# Patient Record
Sex: MALE | Race: WHITE | Employment: OTHER | URBAN - METROPOLITAN AREA
[De-identification: names, ages, dates, MRNs, and addresses within clinical notes are randomized per-mention and may not be internally consistent; named-entity substitution may affect disease eponyms.]

---

## 2017-02-07 ENCOUNTER — HOSPITAL ENCOUNTER (INPATIENT)
Facility: HOSPITAL | Age: 82
LOS: 9 days | Discharge: NURSING, REHAB OR LONG-TERM CARE | DRG: 190 | End: 2017-02-16
Attending: EMERGENCY MEDICINE | Admitting: HOSPITALIST
Payer: MEDICARE

## 2017-02-07 ENCOUNTER — APPOINTMENT (OUTPATIENT)
Dept: CT IMAGING | Facility: HOSPITAL | Age: 82
DRG: 190 | End: 2017-02-07
Attending: EMERGENCY MEDICINE
Payer: MEDICARE

## 2017-02-07 ENCOUNTER — APPOINTMENT (OUTPATIENT)
Dept: GENERAL RADIOLOGY | Facility: HOSPITAL | Age: 82
DRG: 190 | End: 2017-02-07
Attending: EMERGENCY MEDICINE
Payer: MEDICARE

## 2017-02-07 DIAGNOSIS — R04.2 HEMOPTYSIS: Primary | ICD-10-CM

## 2017-02-07 LAB
ALBUMIN SERPL BCP-MCNC: 2.9 G/DL (ref 3.5–4.8)
ALBUMIN/GLOB SERPL: 0.8 {RATIO} (ref 1–2)
ALP SERPL-CCNC: 49 U/L (ref 32–100)
ALT SERPL-CCNC: 13 U/L (ref 17–63)
ANION GAP SERPL CALC-SCNC: 6 MMOL/L (ref 0–18)
ANTIBODY SCREEN: NEGATIVE
APTT PPP: 46.9 SECONDS (ref 23.2–35.3)
AST SERPL-CCNC: 20 U/L (ref 15–41)
BASOPHILS # BLD: 0.1 K/UL (ref 0–0.2)
BASOPHILS NFR BLD: 1 %
BILIRUB SERPL-MCNC: 0.9 MG/DL (ref 0.3–1.2)
BUN SERPL-MCNC: 12 MG/DL (ref 8–20)
BUN/CREAT SERPL: 10.1 (ref 10–20)
CALCIUM SERPL-MCNC: 8.5 MG/DL (ref 8.5–10.5)
CHLORIDE SERPL-SCNC: 107 MMOL/L (ref 95–110)
CO2 SERPL-SCNC: 31 MMOL/L (ref 22–32)
CREAT SERPL-MCNC: 1.19 MG/DL (ref 0.5–1.5)
EOSINOPHIL # BLD: 0.1 K/UL (ref 0–0.7)
EOSINOPHIL NFR BLD: 2 %
ERYTHROCYTE [DISTWIDTH] IN BLOOD BY AUTOMATED COUNT: 15.3 % (ref 11–15)
GLOBULIN PLAS-MCNC: 3.5 G/DL (ref 2.5–3.7)
GLUCOSE SERPL-MCNC: 92 MG/DL (ref 70–99)
HCT VFR BLD AUTO: 42.5 % (ref 41–52)
HGB BLD-MCNC: 13.8 G/DL (ref 13.5–17.5)
INR BLD: 4.4 (ref 0.9–1.2)
LYMPHOCYTES # BLD: 1 K/UL (ref 1–4)
LYMPHOCYTES NFR BLD: 16 %
MCH RBC QN AUTO: 29.8 PG (ref 27–32)
MCHC RBC AUTO-ENTMCNC: 32.3 G/DL (ref 32–37)
MCV RBC AUTO: 92.3 FL (ref 80–100)
MONOCYTES # BLD: 0.7 K/UL (ref 0–1)
MONOCYTES NFR BLD: 11 %
NEUTROPHILS # BLD AUTO: 4.3 K/UL (ref 1.8–7.7)
NEUTROPHILS NFR BLD: 69 %
OSMOLALITY UR CALC.SUM OF ELEC: 297 MOSM/KG (ref 275–295)
PLATELET # BLD AUTO: 163 K/UL (ref 140–400)
PMV BLD AUTO: 7.6 FL (ref 7.4–10.3)
POTASSIUM SERPL-SCNC: 4.1 MMOL/L (ref 3.3–5.1)
PROT SERPL-MCNC: 6.4 G/DL (ref 5.9–8.4)
PROTHROMBIN TIME: 41.5 SECONDS (ref 11.8–14.5)
RBC # BLD AUTO: 4.61 M/UL (ref 4.5–5.9)
RH BLOOD TYPE: POSITIVE
SODIUM SERPL-SCNC: 144 MMOL/L (ref 136–144)
WBC # BLD AUTO: 6.3 K/UL (ref 4–11)

## 2017-02-07 PROCEDURE — 74177 CT ABD & PELVIS W/CONTRAST: CPT

## 2017-02-07 PROCEDURE — 99223 1ST HOSP IP/OBS HIGH 75: CPT | Performed by: HOSPITALIST

## 2017-02-07 PROCEDURE — 71010 XR CHEST AP PORTABLE  (CPT=71010): CPT

## 2017-02-07 PROCEDURE — 71260 CT THORAX DX C+: CPT

## 2017-02-07 RX ORDER — FLUTICASONE PROPIONATE AND SALMETEROL 500; 50 UG/1; UG/1
1 POWDER RESPIRATORY (INHALATION) 2 TIMES DAILY
COMMUNITY
End: 2018-01-02

## 2017-02-07 RX ORDER — CALCIUM CARBONATE 500(1250)
500 TABLET ORAL DAILY
Status: DISCONTINUED | OUTPATIENT
Start: 2017-02-08 | End: 2017-02-16

## 2017-02-07 RX ORDER — IPRATROPIUM BROMIDE AND ALBUTEROL SULFATE 2.5; .5 MG/3ML; MG/3ML
3 SOLUTION RESPIRATORY (INHALATION) EVERY 6 HOURS PRN
Status: DISCONTINUED | OUTPATIENT
Start: 2017-02-07 | End: 2017-02-16

## 2017-02-07 RX ORDER — CARVEDILOL 6.25 MG/1
6.25 TABLET ORAL 2 TIMES DAILY WITH MEALS
Status: DISCONTINUED | OUTPATIENT
Start: 2017-02-07 | End: 2017-02-16

## 2017-02-07 RX ORDER — ACETAMINOPHEN 325 MG/1
650 TABLET ORAL EVERY 6 HOURS PRN
Status: DISCONTINUED | OUTPATIENT
Start: 2017-02-07 | End: 2017-02-16

## 2017-02-07 RX ORDER — ASPIRIN 81 MG/1
81 TABLET, CHEWABLE ORAL DAILY
COMMUNITY

## 2017-02-07 RX ORDER — ASPIRIN 81 MG/1
81 TABLET, CHEWABLE ORAL DAILY
Status: DISCONTINUED | OUTPATIENT
Start: 2017-02-08 | End: 2017-02-16

## 2017-02-07 RX ORDER — SODIUM PHOSPHATE, DIBASIC AND SODIUM PHOSPHATE, MONOBASIC 7; 19 G/133ML; G/133ML
1 ENEMA RECTAL ONCE AS NEEDED
Status: ACTIVE | OUTPATIENT
Start: 2017-02-07 | End: 2017-02-07

## 2017-02-07 RX ORDER — POLYETHYLENE GLYCOL 3350 17 G/17G
17 POWDER, FOR SOLUTION ORAL DAILY PRN
Status: DISCONTINUED | OUTPATIENT
Start: 2017-02-07 | End: 2017-02-16

## 2017-02-07 RX ORDER — GUAIFENESIN 100 MG/5ML
200 SOLUTION ORAL EVERY 4 HOURS PRN
Status: DISCONTINUED | OUTPATIENT
Start: 2017-02-07 | End: 2017-02-16

## 2017-02-07 RX ORDER — SODIUM CHLORIDE 9 MG/ML
INJECTION, SOLUTION INTRAVENOUS ONCE
Status: COMPLETED | OUTPATIENT
Start: 2017-02-07 | End: 2017-02-07

## 2017-02-07 RX ORDER — DOCUSATE SODIUM 100 MG/1
100 CAPSULE, LIQUID FILLED ORAL 2 TIMES DAILY
Status: DISCONTINUED | OUTPATIENT
Start: 2017-02-07 | End: 2017-02-16

## 2017-02-07 RX ORDER — BISACODYL 10 MG
10 SUPPOSITORY, RECTAL RECTAL
Status: DISCONTINUED | OUTPATIENT
Start: 2017-02-07 | End: 2017-02-16

## 2017-02-07 RX ORDER — DOXEPIN HYDROCHLORIDE 50 MG/1
1 CAPSULE ORAL DAILY
COMMUNITY

## 2017-02-07 RX ORDER — EZETIMIBE AND SIMVASTATIN 10; 20 MG/1; MG/1
1 TABLET ORAL NIGHTLY
COMMUNITY

## 2017-02-07 RX ORDER — RAMIPRIL 2.5 MG/1
5 CAPSULE ORAL DAILY
Status: DISCONTINUED | OUTPATIENT
Start: 2017-02-08 | End: 2017-02-09

## 2017-02-07 RX ORDER — WARFARIN SODIUM 1 MG/1
5 TABLET ORAL NIGHTLY
Status: ON HOLD | COMMUNITY
End: 2017-02-16

## 2017-02-07 RX ORDER — SODIUM CHLORIDE 9 MG/ML
INJECTION, SOLUTION INTRAVENOUS
Status: COMPLETED
Start: 2017-02-07 | End: 2017-02-07

## 2017-02-07 RX ORDER — FUROSEMIDE 20 MG/1
20 TABLET ORAL
Status: DISCONTINUED | OUTPATIENT
Start: 2017-02-10 | End: 2017-02-09

## 2017-02-07 RX ORDER — FUROSEMIDE 40 MG/1
40 TABLET ORAL
Status: DISCONTINUED | OUTPATIENT
Start: 2017-02-08 | End: 2017-02-09

## 2017-02-07 RX ORDER — ACETAMINOPHEN 500 MG
500 TABLET ORAL 2 TIMES DAILY
Status: DISCONTINUED | OUTPATIENT
Start: 2017-02-07 | End: 2017-02-16

## 2017-02-07 RX ORDER — FUROSEMIDE 20 MG/1
40 TABLET ORAL DAILY
COMMUNITY

## 2017-02-07 RX ORDER — ACETAMINOPHEN 500 MG
500 TABLET ORAL 2 TIMES DAILY
Status: ON HOLD | COMMUNITY
End: 2017-02-16

## 2017-02-07 RX ORDER — ONDANSETRON 2 MG/ML
4 INJECTION INTRAMUSCULAR; INTRAVENOUS EVERY 6 HOURS PRN
Status: DISCONTINUED | OUTPATIENT
Start: 2017-02-07 | End: 2017-02-16

## 2017-02-07 RX ORDER — DOXEPIN HYDROCHLORIDE 50 MG/1
1 CAPSULE ORAL DAILY
Status: DISCONTINUED | OUTPATIENT
Start: 2017-02-08 | End: 2017-02-16

## 2017-02-07 RX ORDER — CARVEDILOL 6.25 MG/1
6.25 TABLET ORAL 2 TIMES DAILY WITH MEALS
COMMUNITY
End: 2018-01-02

## 2017-02-07 RX ORDER — AZITHROMYCIN 250 MG/1
500 TABLET, FILM COATED ORAL ONCE
Status: COMPLETED | OUTPATIENT
Start: 2017-02-07 | End: 2017-02-07

## 2017-02-07 RX ORDER — RAMIPRIL 5 MG/1
5 CAPSULE ORAL DAILY
COMMUNITY
End: 2018-01-01

## 2017-02-07 NOTE — ED PROVIDER NOTES
Patient Seen in: [facility] AND [facility] Emergency Department    History   Patient presents with:  Hemoptysis (respiratory)    Stated Complaint: Hemoptysis    HPI    49-year-old male presents for evaluation of hemoptysis.   Patient reports last night he had phl cannula       Current:/83 mmHg  Pulse 75  Resp 24  Ht 180.3 cm (5' 11\")  Wt 81.647 kg  BMI 25.12 kg/m2  SpO2 94%        Physical Exam   Constitutional: He is oriented to person, place, and time. He appears well-developed and well-nourished.  No distr Hyperinflation with COPD/centrilobular emphysema and multifocal pleural-parenchymal scarring. 4. Mild central pulmonary interstitial edema.                 ED Course     Labs Reviewed   COMP METABOLIC PANEL (14) - Abnormal; Notable for the following:     A GREEN   RAINBOW DRAW LAVENDER TALL (BNP)      EKG    Rate, intervals and axes as noted on EKG Report.   QTc 455   Rate: 62  Rhythm: Atrial Fibrillation  Reading: Rate controlled atrial fibrillation with right bundle branch block            MDM   Diff

## 2017-02-07 NOTE — H&P
[facility]    PATIENT'S NAME: Jose Wood   ATTENDING PHYSICIAN: Rosa Main MD   PATIENT ACCOUNT#:   [de-identified]    LOCATION:  [place] [address]  MEDICAL RECORD #:   [id number]       YOB: 1929  ADMISSION DATE:       02/07/2 OF SYSTEMS:  Progressive cough with occasional wheezing for the last week or two. No fever or chills. Lately, he has been noticing he had specks of blood with his sputum, which is clear to green. The patient denies any sick contacts. No chest pain.   Ot 16:14:21  t: 02/07/2017 16:24:39  Job [id number]  /

## 2017-02-07 NOTE — ED INITIAL ASSESSMENT (HPI)
Patient complains of hemoptysis starting this morning.  States that he's had increased work in breathing over the past month but denies any newer onset AMBROSE, SOB, or CP

## 2017-02-08 ENCOUNTER — APPOINTMENT (OUTPATIENT)
Dept: GENERAL RADIOLOGY | Facility: HOSPITAL | Age: 82
DRG: 190 | End: 2017-02-08
Attending: HOSPITALIST
Payer: MEDICARE

## 2017-02-08 ENCOUNTER — APPOINTMENT (OUTPATIENT)
Dept: ULTRASOUND IMAGING | Facility: HOSPITAL | Age: 82
DRG: 190 | End: 2017-02-08
Attending: INTERNAL MEDICINE
Payer: MEDICARE

## 2017-02-08 ENCOUNTER — APPOINTMENT (OUTPATIENT)
Dept: GENERAL RADIOLOGY | Facility: HOSPITAL | Age: 82
DRG: 190 | End: 2017-02-08
Attending: INTERNAL MEDICINE
Payer: MEDICARE

## 2017-02-08 LAB
AMYLASE PLEURAL FLUID: 37 U/L
ANION GAP SERPL CALC-SCNC: 6 MMOL/L (ref 0–18)
BASOPHILS # BLD: 0.1 K/UL (ref 0–0.2)
BASOPHILS NFR BLD: 1 %
BUN SERPL-MCNC: 12 MG/DL (ref 8–20)
BUN/CREAT SERPL: 10.3 (ref 10–20)
CALCIUM SERPL-MCNC: 8 MG/DL (ref 8.5–10.5)
CHLORIDE SERPL-SCNC: 106 MMOL/L (ref 95–110)
CO2 SERPL-SCNC: 28 MMOL/L (ref 22–32)
CREAT SERPL-MCNC: 1.16 MG/DL (ref 0.5–1.5)
EOSINOPHIL # BLD: 0.2 K/UL (ref 0–0.7)
EOSINOPHIL NFR BLD: 3 %
ERYTHROCYTE [DISTWIDTH] IN BLOOD BY AUTOMATED COUNT: 15.3 % (ref 11–15)
GLUCOSE PLEURAL FLUID: 121 MG/DL
GLUCOSE SERPL-MCNC: 85 MG/DL (ref 70–99)
HCT VFR BLD AUTO: 40 % (ref 41–52)
HGB BLD-MCNC: 12.9 G/DL (ref 13.5–17.5)
INR BLD: 1.4 (ref 0.9–1.2)
L PNEUMO AG UR QL: NEGATIVE
LDH PLEURAL FLUID: 72 U/L
LYMPHOCYTES # BLD: 1.2 K/UL (ref 1–4)
LYMPHOCYTES NFR BLD: 21 %
Lab: <10 MG/DL
MCH RBC QN AUTO: 29.6 PG (ref 27–32)
MCHC RBC AUTO-ENTMCNC: 32.2 G/DL (ref 32–37)
MCV RBC AUTO: 91.9 FL (ref 80–100)
MONOCYTES # BLD: 0.7 K/UL (ref 0–1)
MONOCYTES NFR BLD: 12 %
NEUTROPHILS # BLD AUTO: 3.6 K/UL (ref 1.8–7.7)
NEUTROPHILS NFR BLD: 63 %
OSMOLALITY UR CALC.SUM OF ELEC: 289 MOSM/KG (ref 275–295)
PLATELET # BLD AUTO: 145 K/UL (ref 140–400)
PMV BLD AUTO: 7.5 FL (ref 7.4–10.3)
POTASSIUM SERPL-SCNC: 4.1 MMOL/L (ref 3.3–5.1)
PROTHROMBIN TIME: 16.3 SECONDS (ref 11.8–14.5)
RBC # BLD AUTO: 4.36 M/UL (ref 4.5–5.9)
RBC # FLD: ABNORMAL /CUMM (ref ?–1)
SODIUM SERPL-SCNC: 140 MMOL/L (ref 136–144)
STREP PNEUMO ANTIGEN, URINE: NEGATIVE
TOTAL PROTEIN PLEURAL FLUID: 2.1 G/DL
WBC # BLD AUTO: 5.8 K/UL (ref 4–11)
WBC # FLD: 1092 /CUMM (ref ?–1)

## 2017-02-08 PROCEDURE — 0W9B3ZX DRAINAGE OF LEFT PLEURAL CAVITY, PERCUTANEOUS APPROACH, DIAGNOSTIC: ICD-10-PCS | Performed by: INTERNAL MEDICINE

## 2017-02-08 PROCEDURE — 71010 XR CHEST AP PORTABLE  (CPT=71010): CPT

## 2017-02-08 PROCEDURE — 32555 ASPIRATE PLEURA W/ IMAGING: CPT | Performed by: INTERNAL MEDICINE

## 2017-02-08 PROCEDURE — 71020 XR CHEST PA + LAT CHEST (CPT=71020): CPT

## 2017-02-08 PROCEDURE — 32555 ASPIRATE PLEURA W/ IMAGING: CPT

## 2017-02-08 PROCEDURE — 99223 1ST HOSP IP/OBS HIGH 75: CPT | Performed by: INTERNAL MEDICINE

## 2017-02-08 PROCEDURE — 99233 SBSQ HOSP IP/OBS HIGH 50: CPT | Performed by: HOSPITALIST

## 2017-02-08 NOTE — PROGRESS NOTES
[facility]    Progress Note    Woo Glynn Patient Status:  Inpatient    1929 MRN [id number]   Location [facility] 3W/SW Attending Arzella Saint, MD   Hosp Day # 1 PCP Haylee Wolff MD       SUBJECTIVE:  Today still wi Only)(jgl=[id number])    2/7/2017  CONCLUSION:  1. Multifocal pneumonia, worse in the left upper and lower lobes, with lesser airspace disease involving the right lower lobe.  Followup is recommended to document resolution and exclude potential underlying Mon Wed Thu Sat   [START ON 2/10/2017] furosemide (LASIX) tab 20 mg 20 mg Oral Once per day on Tue Fri   ramipril (ALTACE) cap 5 mg 5 mg Oral Daily   carvedilol (COREG) tab 6.25 mg 6.25 mg Oral BID with meals   ezetimibe (ZETIA) 10 mg, Atorvastatin Calcium will require TBD.

## 2017-02-08 NOTE — CONSULTS
[facility]    Report of Consultation    Maryclare Handing Patient Status:  Inpatient    1929 MRN [id number]   Location [facility] 3W/SW Attending Benito Elam MD   Hosp Day # 1 PCP Alexia Oreilly MD     Date of Admission:   guaiFENesin (ROBITUSSIN) 100 MG/5ML solution 200 mg 200 mg Oral Q4H PRN   acetaminophen (TYLENOL) tab 650 mg 650 mg Oral Q6H PRN   ondansetron HCl (ZOFRAN) injection 4 mg 4 mg Intravenous Q6H PRN   docusate sodium (COLACE) cap 100 mg 100 mg Oral BID   PE multivitamin Oral Tab Take 1 tablet by mouth daily. Calcium Carbonate Antacid 600 MG Oral Chew Tab Chew 600 mg by mouth daily. acetaminophen 500 MG Oral Tab Take 500 mg by mouth 2 (two) times daily.        Allergies    Morphine                Hypotens [facility], XR CHEST AP PORTABLE (CPT=71010), 2/07/2017, 12:35. INDICATIONS: Cough. Hx COPD, CHF, pneumonia. History hemoptysis  TECHNIQUE:   Two views. FINDINGS: CARDIAC/VASC: Mild Cardiomegaly. .  Unremarkable pulmonary vasculature.   MEDIAST/HIL technique for dose reduction was employed. FINDINGS: CARDIAC: The heart is borderline enlarged. Aortic annular calcifications are observed. Atherosclerotic vascular calcifications are present in the coronary vessels.  VASCULATURE: The thoracic aorta has u identified and are not completely characterized, but statistically likely represent cysts. GI/MESENTERY:  There is no evidence of bowel obstruction. A normal caliber appendix is seen without inflammatory manifestations.  Scattered colonic diverticula are pr aneurysm sac measuring up to 4.4 cm. 4. Moderate to severe centrilobular emphysema. 5. Mild mediastinal lymphadenopathy may be reactive. 6. Extrahepatic biliary dilatation may reflect age-related ectasia. 7. Lesser incidental findings as above. (CST): Kristyn Henry M.D. on 2/08/2017 at 17:14     Approved by (CST): Kristyn Henry M.D. on 2/08/2017 at 17:19          2/8/2017  CONCLUSION:  1. Mild cardiomegaly. Tortuous thoracic aorta.  2. Bilateral parenchymal abnormality/pneumonitis wi (CST): Shani Durham MD on 2/07/2017 at 12:58          2/7/2017  CONCLUSION:  1. Bibasilar airspace disease may reflect atelectasis with or without superimposed pneumonia. 2. Layering small left pleural effusion.   3. Hyperinflation with COPD/centrilobul

## 2017-02-08 NOTE — PROCEDURES
Left ultrasound-guided thoracentesis    Ultrasound used to visualized moderate sized left pleural effusion with no evidence of  loculations seen and area of entry marked. Patient prepped and draped in sterile fashion.   10 cc of 1% lidocaine used to [employer]

## 2017-02-08 NOTE — PROGRESS NOTES
This patient presented to the emergency room with hemoptysis and exacerbation of COPD. ER physician shows to get a CT of the chest and abdomen which demonstrates an endovascular stent graft in place. I was asked to review the imaging of the stent graft.

## 2017-02-09 LAB
ANION GAP SERPL CALC-SCNC: 4 MMOL/L (ref 0–18)
BASOPHILS # BLD: 0.1 K/UL (ref 0–0.2)
BASOPHILS NFR BLD: 1 %
BASOPHILS NFR FLD: 1 %
BUN SERPL-MCNC: 15 MG/DL (ref 8–20)
BUN/CREAT SERPL: 10.4 (ref 10–20)
CALCIUM SERPL-MCNC: 8.2 MG/DL (ref 8.5–10.5)
CHLORIDE SERPL-SCNC: 103 MMOL/L (ref 95–110)
CO2 SERPL-SCNC: 34 MMOL/L (ref 22–32)
CREAT SERPL-MCNC: 1.44 MG/DL (ref 0.5–1.5)
EOSINOPHIL # BLD: 0.2 K/UL (ref 0–0.7)
EOSINOPHIL NFR BLD: 3 %
EOSINOPHIL NFR FLD: 1 %
ERYTHROCYTE [DISTWIDTH] IN BLOOD BY AUTOMATED COUNT: 15.6 % (ref 11–15)
GLUCOSE SERPL-MCNC: 103 MG/DL (ref 70–99)
HCT VFR BLD AUTO: 39.5 % (ref 41–52)
HGB BLD-MCNC: 12.8 G/DL (ref 13.5–17.5)
LDH SERPL L TO P-CCNC: 160 U/L (ref 98–192)
LYMPHOCYTES # BLD: 1.3 K/UL (ref 1–4)
LYMPHOCYTES NFR BLD: 17 %
LYMPHOCYTES NFR FLD: 71 %
MCH RBC QN AUTO: 29.6 PG (ref 27–32)
MCHC RBC AUTO-ENTMCNC: 32.4 G/DL (ref 32–37)
MCV RBC AUTO: 91.4 FL (ref 80–100)
MONOCYTES # BLD: 0.9 K/UL (ref 0–1)
MONOCYTES NFR BLD: 11 %
MONOCYTES NFR FLD: 19 %
NEUTROPHILS # BLD AUTO: 5.2 K/UL (ref 1.8–7.7)
NEUTROPHILS NFR BLD: 68 %
NEUTROPHILS NFR FLD: 8 %
OSMOLALITY UR CALC.SUM OF ELEC: 293 MOSM/KG (ref 275–295)
PLATELET # BLD AUTO: 147 K/UL (ref 140–400)
PMV BLD AUTO: 7.7 FL (ref 7.4–10.3)
POTASSIUM SERPL-SCNC: 4.3 MMOL/L (ref 3.3–5.1)
PROCALCITONIN SERPL-MCNC: <0.05 NG/ML (ref ?–0.11)
RBC # BLD AUTO: 4.32 M/UL (ref 4.5–5.9)
SODIUM SERPL-SCNC: 141 MMOL/L (ref 136–144)
WBC # BLD AUTO: 7.6 K/UL (ref 4–11)
WBC OTHER NFR FLD: 0 %

## 2017-02-09 PROCEDURE — 99233 SBSQ HOSP IP/OBS HIGH 50: CPT | Performed by: HOSPITALIST

## 2017-02-09 PROCEDURE — 99232 SBSQ HOSP IP/OBS MODERATE 35: CPT | Performed by: INTERNAL MEDICINE

## 2017-02-09 RX ORDER — SODIUM CHLORIDE 9 MG/ML
INJECTION, SOLUTION INTRAVENOUS
Status: COMPLETED
Start: 2017-02-09 | End: 2017-02-09

## 2017-02-09 NOTE — PROGRESS NOTES
[facility]    Progress Note    Tarsha Ortiz Patient Status:  Inpatient    1929 MRN [id number]   Location [facility] 3W/SW Attending Sharon Diana MD   Hosp Day # 2 PCP Katarina Bonds MD       SUBJECTIVE:    Feeling okay Only)(xen=[id number])    2/7/2017  CONCLUSION:  1. Multifocal pneumonia, worse in the left upper and lower lobes, with lesser airspace disease involving the right lower lobe.  Followup is recommended to document resolution and exclude potential underlying Oral Q6H PRN   ondansetron HCl (ZOFRAN) injection 4 mg 4 mg Intravenous Q6H PRN   docusate sodium (COLACE) cap 100 mg 100 mg Oral BID   PEG 3350 (MIRALAX) powder packet 17 g 17 g Oral Daily PRN   magnesium hydroxide (MILK OF MAGNESIA) 400 MG/5ML suspension

## 2017-02-09 NOTE — PLAN OF CARE
Problem: Patient/Family Goals  Goal: Patient/Family Long Term Goal  Patient’s Long Term Goal:     Interventions:  - stay healthy  - See additional Care Plan goals for specific interventions   Outcome: Progressing  Goal: Patient/Family Short Term Goal  Payal

## 2017-02-09 NOTE — PROGRESS NOTES
[facility]     Progress Note        Toan Petit Patient Status:  Inpatient    1929 MRN [id number]   Location [facility] 3W/SW Attending Real Do MD   Hosp Day # 2 PCP Georgie Doss MD       Subjective:   Patient se mg Oral BID   Fluticasone Furoate-Vilanterol (BREO ELLIPTA) 200-25 MCG/INH inhaler 1 puff 1 puff Inhalation Daily   Umeclidinium Bromide (INCRUSE ELLIPTA) 62.5 MCG/INH inhaler 1 puff 1 puff Inhalation Daily       Continuous Infusions:      Physical Exam  C 1. Mild cardiomegaly. Tortuous aorta. 2. Emphysematous changes. Extensive bilateral lung consolidation with marked progression of the perihilar and basilar region. Moderate interval worsening of the right base.  3. Bilateral pleural effusions, left greater AIRWAYS: Mild narrowing of the transverse diameter of the trachea is seen with a slight saber sheath configuration. Mild bronchial wall thickening is appreciated. The tracheobronchial tree is without central mass or obstructing lesion.  MEDIASTINUM/SOCRATES: A loss of vertebral body height of the mid thoracic vertebrae resulting in accentuation of the thoracic kyphosis. Multilevel endplate irregularities are present. 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  ABDOMINAL WALL: There is ventral abdominal laxit consolidation  Dictated by (CST): Delvin Delgadillo MD on 2/08/2017 at 15:46     Approved by (CST): Delvin Delgadillo MD on 2/08/2017 at 15:48          2/8/2017  CONCLUSION:  1.  Ultrasound-guided left lung thoracentesis         Xr Chest Ap Portable  (cpt=71010 onset; shortness of breath of the preceding the one month. TECHNIQUE:   Single view. FINDINGS:  POSITION: The patient is moderately rotated to the right with a slightly apical lordotic projection.  CARDIAC/VASC: The cardiomediastinal silhouette is not en chronic respiratory failure  6.  Chronic atrial fibrillation         Plan   -Patient with evidence of dyspnea, cough, hemoptysis with supratherapeutic INR on presentation  -CT chest with evidence of left sided consolidation with significant left pleural ef

## 2017-02-10 ENCOUNTER — APPOINTMENT (OUTPATIENT)
Dept: CT IMAGING | Facility: HOSPITAL | Age: 82
DRG: 190 | End: 2017-02-10
Attending: HOSPITALIST
Payer: MEDICARE

## 2017-02-10 LAB
ANION GAP SERPL CALC-SCNC: 5 MMOL/L (ref 0–18)
BASOPHILS # BLD: 0.1 K/UL (ref 0–0.2)
BASOPHILS NFR BLD: 1 %
BUN SERPL-MCNC: 13 MG/DL (ref 8–20)
BUN/CREAT SERPL: 10.3 (ref 10–20)
CALCIUM SERPL-MCNC: 8.4 MG/DL (ref 8.5–10.5)
CHLORIDE SERPL-SCNC: 104 MMOL/L (ref 95–110)
CO2 SERPL-SCNC: 32 MMOL/L (ref 22–32)
CREAT SERPL-MCNC: 1.26 MG/DL (ref 0.5–1.5)
EOSINOPHIL # BLD: 0.2 K/UL (ref 0–0.7)
EOSINOPHIL NFR BLD: 4 %
ERYTHROCYTE [DISTWIDTH] IN BLOOD BY AUTOMATED COUNT: 15.3 % (ref 11–15)
GLUCOSE SERPL-MCNC: 88 MG/DL (ref 70–99)
HCT VFR BLD AUTO: 41.3 % (ref 41–52)
HGB BLD-MCNC: 13.5 G/DL (ref 13.5–17.5)
INR BLD: 1.5 (ref 0.9–1.2)
LYMPHOCYTES # BLD: 1.1 K/UL (ref 1–4)
LYMPHOCYTES NFR BLD: 17 %
MAGNESIUM SERPL-MCNC: 2 MG/DL (ref 1.8–2.5)
MCH RBC QN AUTO: 29.8 PG (ref 27–32)
MCHC RBC AUTO-ENTMCNC: 32.8 G/DL (ref 32–37)
MCV RBC AUTO: 91 FL (ref 80–100)
MONOCYTES # BLD: 0.8 K/UL (ref 0–1)
MONOCYTES NFR BLD: 12 %
NEUTROPHILS # BLD AUTO: 4.3 K/UL (ref 1.8–7.7)
NEUTROPHILS NFR BLD: 66 %
OSMOLALITY UR CALC.SUM OF ELEC: 292 MOSM/KG (ref 275–295)
PLATELET # BLD AUTO: 146 K/UL (ref 140–400)
PMV BLD AUTO: 7.6 FL (ref 7.4–10.3)
POTASSIUM SERPL-SCNC: 4.2 MMOL/L (ref 3.3–5.1)
PROTHROMBIN TIME: 17.5 SECONDS (ref 11.8–14.5)
RBC # BLD AUTO: 4.53 M/UL (ref 4.5–5.9)
SODIUM SERPL-SCNC: 141 MMOL/L (ref 136–144)
WBC # BLD AUTO: 6.5 K/UL (ref 4–11)

## 2017-02-10 PROCEDURE — 71260 CT THORAX DX C+: CPT

## 2017-02-10 PROCEDURE — 99232 SBSQ HOSP IP/OBS MODERATE 35: CPT | Performed by: INTERNAL MEDICINE

## 2017-02-10 PROCEDURE — 99233 SBSQ HOSP IP/OBS HIGH 50: CPT | Performed by: HOSPITALIST

## 2017-02-10 RX ORDER — SODIUM CHLORIDE 9 MG/ML
INJECTION, SOLUTION INTRAVENOUS
Status: COMPLETED
Start: 2017-02-10 | End: 2017-02-10

## 2017-02-10 RX ORDER — DIPHENHYDRAMINE HCL 25 MG
25 CAPSULE ORAL ONCE
Status: COMPLETED | OUTPATIENT
Start: 2017-02-10 | End: 2017-02-10

## 2017-02-10 RX ORDER — SODIUM CHLORIDE 9 MG/ML
INJECTION, SOLUTION INTRAVENOUS CONTINUOUS
Status: DISCONTINUED | OUTPATIENT
Start: 2017-02-10 | End: 2017-02-16

## 2017-02-10 RX ORDER — 0.9 % SODIUM CHLORIDE 0.9 %
VIAL (ML) INJECTION
Status: DISPENSED
Start: 2017-02-10 | End: 2017-02-11

## 2017-02-10 NOTE — PLAN OF CARE
Patient/Family Goals    • Patient/Family Long Term Goal Progressing    • Patient/Family Short Term Goal Progressing        RESPIRATORY - ADULT    • Achieves optimal ventilation and oxygenation Progressing        Pt has HX COPD & Emphysema and wears 2.3-3L

## 2017-02-10 NOTE — PROGRESS NOTES
[facility]    Progress Note    Alaurelia Zimmerman Patient Status:  Inpatient    1929 MRN [id number]   Location [facility] 3W/SW Attending Damián Elizabeth MD   Hosp Day # 3 PCP Dale Luke MD       SUBJECTIVE:    Feeling abou Ct Chest+abdomen+pelvis(all Cntrst Only)(cpt=71260/01636)    2/7/2017  CONCLUSION:  1. Multifocal pneumonia, worse in the left upper and lower lobes, with lesser airspace disease involving the right lower lobe.  Followup is recommended to document resol acetaminophen (TYLENOL) tab 650 mg 650 mg Oral Q6H PRN   ondansetron HCl (ZOFRAN) injection 4 mg 4 mg Intravenous Q6H PRN   docusate sodium (COLACE) cap 100 mg 100 mg Oral BID   PEG 3350 (MIRALAX) powder packet 17 g 17 g Oral Daily PRN   magnesium hydrox counseling re: treatment plan and workup      Georgina Magallanes MD

## 2017-02-10 NOTE — CM/SW NOTE
Met with patient at bedside to enroll patient in [address] [place] [address] program under DRG (194). Brochure was provided, program was explained. Patient is agreeable to program's 3 month phone call follow up.  [facility] brochure was provided, progra

## 2017-02-10 NOTE — PROGRESS NOTES
[facility]     Progress Note        Read Ralph Patient Status:  Inpatient    1929 MRN [id number]   Location [facility] 3W/SW Attending Fredy Hester MD   Hosp Day # 3 PCP Shannan Saldana MD       Subjective:   Patient se (OSCAL) tab 500 mg 500 mg Oral Daily   acetaminophen (TYLENOL EXTRA STRENGTH) tab 500 mg 500 mg Oral BID   Fluticasone Furoate-Vilanterol (BREO ELLIPTA) 200-25 MCG/INH inhaler 1 puff 1 puff Inhalation Daily   Umeclidinium Bromide (INCRUSE ELLIPTA) 62.5 MCG thoracic kyphosis. Dictated by (CST): Alexander Sigala M.D. on 2/08/2017 at 10:29     Approved by (CST): Alexander Sigala M.D. on 2/08/2017 at 10:33          2/8/2017  CONCLUSION:  1. Mild cardiomegaly. Tortuous aorta. 2. Emphysematous changes.  Ex present in the right lower lobe. Moderate left pleural effusion with partial loculation is present. There is a small to moderate right pleural effusion. No pneumothorax is detected.   AIRWAYS: Mild narrowing of the transverse diameter of the trachea is seen RETROPERITONEUM: No mass or lymphadenopathy is apparent. BONES:   Multiapex scoliosis of the thoracolumbar spine. Multilevel degenerative changes of the spine are demonstrated.  There is loss of vertebral body height of the mid thoracic vertebrae resulting Ultrasound guided left sided thoracentesis, performed by Dr. Yvonne Todd. No radiologist was present.   Images obtained show large left effusion with secondary left lung atelectasis and/or partial consolidation  Dictated by (CST): Jeannine Jack MD on 2/08/201 10/13/2014, 16:22. [facility], X CHEST PA LAT ROUTINE, 1/17/2016, 20:45. [facility], X CHEST PA LAT ROUTINE, 2/01/2016, 10:56. INDICATIONS: Hemoptysis of acute onset; shortness of breath of the preceding the one month.   Yuri Barahona significant left pleural effusion and atelectatic changes seen.  Concern for pneumonia.  -Continue empiric antibiotic therapy.    -Bedside thoracentesis performed with removal of 1500 cc of slightly  blood-tinged pleural fluid.  Awaiting pleural fluid sean

## 2017-02-11 LAB
ANION GAP SERPL CALC-SCNC: 10 MMOL/L (ref 0–18)
BASOPHILS # BLD: 0.1 K/UL (ref 0–0.2)
BASOPHILS NFR BLD: 1 %
BUN SERPL-MCNC: 14 MG/DL (ref 8–20)
BUN/CREAT SERPL: 10.5 (ref 10–20)
CALCIUM SERPL-MCNC: 8.8 MG/DL (ref 8.5–10.5)
CHLORIDE SERPL-SCNC: 101 MMOL/L (ref 95–110)
CO2 SERPL-SCNC: 30 MMOL/L (ref 22–32)
CREAT SERPL-MCNC: 1.33 MG/DL (ref 0.5–1.5)
EOSINOPHIL # BLD: 0.3 K/UL (ref 0–0.7)
EOSINOPHIL NFR BLD: 4 %
ERYTHROCYTE [DISTWIDTH] IN BLOOD BY AUTOMATED COUNT: 15.5 % (ref 11–15)
GLUCOSE SERPL-MCNC: 114 MG/DL (ref 70–99)
HCT VFR BLD AUTO: 43.4 % (ref 41–52)
HGB BLD-MCNC: 14 G/DL (ref 13.5–17.5)
LYMPHOCYTES # BLD: 1.2 K/UL (ref 1–4)
LYMPHOCYTES NFR BLD: 16 %
MCH RBC QN AUTO: 29.8 PG (ref 27–32)
MCHC RBC AUTO-ENTMCNC: 32.3 G/DL (ref 32–37)
MCV RBC AUTO: 92.3 FL (ref 80–100)
MONOCYTES # BLD: 0.8 K/UL (ref 0–1)
MONOCYTES NFR BLD: 11 %
NEUTROPHILS # BLD AUTO: 5.2 K/UL (ref 1.8–7.7)
NEUTROPHILS NFR BLD: 68 %
OSMOLALITY UR CALC.SUM OF ELEC: 293 MOSM/KG (ref 275–295)
PLATELET # BLD AUTO: 169 K/UL (ref 140–400)
PMV BLD AUTO: 7.9 FL (ref 7.4–10.3)
POTASSIUM SERPL-SCNC: 5.1 MMOL/L (ref 3.3–5.1)
RBC # BLD AUTO: 4.7 M/UL (ref 4.5–5.9)
SODIUM SERPL-SCNC: 141 MMOL/L (ref 136–144)
WBC # BLD AUTO: 7.7 K/UL (ref 4–11)

## 2017-02-11 PROCEDURE — 99233 SBSQ HOSP IP/OBS HIGH 50: CPT | Performed by: HOSPITALIST

## 2017-02-11 PROCEDURE — 99232 SBSQ HOSP IP/OBS MODERATE 35: CPT | Performed by: INTERNAL MEDICINE

## 2017-02-11 NOTE — PROGRESS NOTES
[facility]    Progress Note    Jey Graf Patient Status:  Inpatient    1929 MRN [id number]   Location [facility] 3W/SW Attending Darryn Ríos MD   Hosp Day # 4 PCP Maribel Vicente MD       SUBJECTIVE:    Feeling a li Pulmonary artery hypertension. Us Thoracentesis Guided Left (cpt=32555)    2/8/2017  CONCLUSION:  1. Ultrasound-guided left lung thoracentesis         Xr Chest Ap Portable  (cpt=71010)    2/8/2017  CONCLUSION:  1. Mild cardiomegaly.  Tortuous thoracic Multilobar pneumonia with bilateral pleural effusion; rule out early empyema; rule out malignancy.   - cont azithro and zosyn  - sputum culture ordered and reviewed  - CXR image from today reviewed  - Pulm consulted  - s/p thoracentesis  - fluid analysi

## 2017-02-11 NOTE — PLAN OF CARE
Patient/Family Goals    • Patient/Family Long Term Goal Progressing    • Patient/Family Short Term Goal Progressing        RESPIRATORY - ADULT    • Achieves optimal ventilation and oxygenation Progressing        Pt on 3 L of 02 and sats at 90%; INR 1.5 Cou

## 2017-02-11 NOTE — PLAN OF CARE
Patient/Family Goals    • Patient/Family Long Term Goal Progressing    • Patient/Family Short Term Goal Progressing        RESPIRATORY - ADULT    • Achieves optimal ventilation and oxygenation Progressing          Patient on O2 3L, sats 91-92%.  no c/o sob

## 2017-02-11 NOTE — PROGRESS NOTES
[facility]    Progress Note      Assessment and Plan:   1. Hemoptysis with basilar infiltrates– the patient  had been on anticoagulation with an elevated INR. He may have pneumonia with basilar infiltrates.   Alternatively, I cannot rule o

## 2017-02-12 PROCEDURE — 99233 SBSQ HOSP IP/OBS HIGH 50: CPT | Performed by: INTERNAL MEDICINE

## 2017-02-12 PROCEDURE — 99233 SBSQ HOSP IP/OBS HIGH 50: CPT | Performed by: HOSPITALIST

## 2017-02-12 NOTE — PROGRESS NOTES
[facility]    Progress Note    Jey Graf Patient Status:  Inpatient    1929 MRN [id number]   Location [facility] 3W/SW Attending Darryn Ríos MD   Hosp Day # 5 PCP Maribel Vicente MD       SUBJECTIVE:    Feeling much nebulizer solution 3 mL 3 mL Nebulization Q6H PRN   guaiFENesin (ROBITUSSIN) 100 MG/5ML solution 200 mg 200 mg Oral Q4H PRN   acetaminophen (TYLENOL) tab 650 mg 650 mg Oral Q6H PRN   ondansetron HCl (ZOFRAN) injection 4 mg 4 mg Intravenous Q6H PRN   docusa cont to hold coumadin for possible bronch on Monday    Hemoptysis secondary to elevated INR and sheer forces of cough.    - see above    Prophylaxis:   DVT with SCDs    Dispo: pending    Greater than 35 minutes spent, >50% spent counseling re: treatment nick

## 2017-02-12 NOTE — PLAN OF CARE
Patient/Family Goals    • Patient/Family Long Term Goal Progressing    • Patient/Family Short Term Goal Progressing        RESPIRATORY - ADULT    • Achieves optimal ventilation and oxygenation Progressing          Plan of care reviewed, including medicatio

## 2017-02-12 NOTE — PROGRESS NOTES
[facility]    Progress Note    Saad Frey Patient Status:  Inpatient    1929 MRN [id number]   Location [facility] 3W/SW Attending Sulma Gutierrez MD   Hosp Day # 5 PCP Tyrone Palumbo MD     Subjective:   Subjective: 1. Decreased left, and increased right pleural effusions with associated atelectasis. There is lower lobe predominant superimposed pneumonia, the appearance of which is not significantly changed.  Continued follow up to resolution is advised to exclude the

## 2017-02-13 ENCOUNTER — APPOINTMENT (OUTPATIENT)
Dept: GENERAL RADIOLOGY | Facility: HOSPITAL | Age: 82
DRG: 190 | End: 2017-02-13
Attending: INTERNAL MEDICINE
Payer: MEDICARE

## 2017-02-13 LAB
ANION GAP SERPL CALC-SCNC: 7 MMOL/L (ref 0–18)
BASOPHILS # BLD: 0.1 K/UL (ref 0–0.2)
BASOPHILS NFR BLD: 1 %
BUN SERPL-MCNC: 15 MG/DL (ref 8–20)
BUN/CREAT SERPL: 10.9 (ref 10–20)
CALCIUM SERPL-MCNC: 8.5 MG/DL (ref 8.5–10.5)
CHLORIDE SERPL-SCNC: 104 MMOL/L (ref 95–110)
CO2 SERPL-SCNC: 28 MMOL/L (ref 22–32)
CREAT SERPL-MCNC: 1.37 MG/DL (ref 0.5–1.5)
EOSINOPHIL # BLD: 0.2 K/UL (ref 0–0.7)
EOSINOPHIL NFR BLD: 3 %
ERYTHROCYTE [DISTWIDTH] IN BLOOD BY AUTOMATED COUNT: 15.6 % (ref 11–15)
GLUCOSE SERPL-MCNC: 86 MG/DL (ref 70–99)
HCT VFR BLD AUTO: 40 % (ref 41–52)
HGB BLD-MCNC: 13.2 G/DL (ref 13.5–17.5)
LYMPHOCYTES # BLD: 1.6 K/UL (ref 1–4)
LYMPHOCYTES NFR BLD: 19 %
MCH RBC QN AUTO: 30 PG (ref 27–32)
MCHC RBC AUTO-ENTMCNC: 33 G/DL (ref 32–37)
MCV RBC AUTO: 90.9 FL (ref 80–100)
MONOCYTES # BLD: 0.9 K/UL (ref 0–1)
MONOCYTES NFR BLD: 11 %
NEUTROPHILS # BLD AUTO: 5.8 K/UL (ref 1.8–7.7)
NEUTROPHILS NFR BLD: 67 %
OSMOLALITY UR CALC.SUM OF ELEC: 288 MOSM/KG (ref 275–295)
PLATELET # BLD AUTO: 169 K/UL (ref 140–400)
PMV BLD AUTO: 7.6 FL (ref 7.4–10.3)
POTASSIUM SERPL-SCNC: 4.6 MMOL/L (ref 3.3–5.1)
RBC # BLD AUTO: 4.4 M/UL (ref 4.5–5.9)
SODIUM SERPL-SCNC: 139 MMOL/L (ref 136–144)
WBC # BLD AUTO: 8.6 K/UL (ref 4–11)

## 2017-02-13 PROCEDURE — 99233 SBSQ HOSP IP/OBS HIGH 50: CPT | Performed by: HOSPITALIST

## 2017-02-13 PROCEDURE — 99233 SBSQ HOSP IP/OBS HIGH 50: CPT | Performed by: INTERNAL MEDICINE

## 2017-02-13 PROCEDURE — 71020 XR CHEST PA + LAT CHEST (CPT=71020): CPT

## 2017-02-13 RX ORDER — IPRATROPIUM BROMIDE AND ALBUTEROL SULFATE 2.5; .5 MG/3ML; MG/3ML
3 SOLUTION RESPIRATORY (INHALATION)
Status: DISCONTINUED | OUTPATIENT
Start: 2017-02-13 | End: 2017-02-14

## 2017-02-13 NOTE — PLAN OF CARE
CARDIOVASCULAR - ADULT    • Maintains optimal cardiac output and hemodynamic stability Progressing    • Absence of cardiac arrhythmias or at baseline Progressing    A-FIB ON TELE  COUMADIN ON HOLD  RATES CONTROLLED  NO C/O CHEST PAIN, SOB, OR PALPITATIONS

## 2017-02-13 NOTE — PROGRESS NOTES
[facility]    Progress Note    Judy Hernandez Patient Status:  Inpatient    1929 MRN [id number]   Location [facility] 3W/SW Attending Marichuy Marino MD   Hosp Day # 6 PCP Lashell Vaca MD       SUBJECTIVE:    Feeling tire There is lower lobe predominant superimposed pneumonia, the appearance of which is not significantly changed.  Continued follow up to resolution is advised to exclude the possibility of an underlying mass, particularly given the extent of background emphyse pleural effusion; rule out early empyema; rule out malignancy.   - cont azithro and zosyn  - sputum culture ordered and reviewed  - CXR image from today reviewed  - Pulm consulted  - s/p thoracentesis  - fluid analysis reviewed, await final cx  - repeat CT

## 2017-02-14 ENCOUNTER — SURGERY (OUTPATIENT)
Age: 82
End: 2017-02-14

## 2017-02-14 PROCEDURE — 99232 SBSQ HOSP IP/OBS MODERATE 35: CPT | Performed by: INTERNAL MEDICINE

## 2017-02-14 PROCEDURE — 99233 SBSQ HOSP IP/OBS HIGH 50: CPT | Performed by: HOSPITALIST

## 2017-02-14 RX ORDER — IPRATROPIUM BROMIDE AND ALBUTEROL SULFATE 2.5; .5 MG/3ML; MG/3ML
3 SOLUTION RESPIRATORY (INHALATION)
Status: DISCONTINUED | OUTPATIENT
Start: 2017-02-14 | End: 2017-02-16

## 2017-02-14 NOTE — CM/SW NOTE
Met with patient at bedside to deliver IM. Had met with patient earlier this admission. Daughter is at bedside.  Patient states he has talked to his daughter regarding discharge plan, and patient and daughter state they would like to be evaluated by physica

## 2017-02-14 NOTE — PROGRESS NOTES
Pulmonary/Critical Care Follow Up Note    HPI:   Jhonathan Soares is a 80year old male with Patient presents with:  Hemoptysis (respiratory)      PCP Jenelle Hinojosa MD  Admission Attending Stephan Petersen MD    Hospital Day #6    Feeling better  Less cough Daily  •  Umeclidinium Bromide (INCRUSE ELLIPTA) 62.5 MCG/INH inhaler 1 puff, 1 puff, Inhalation, Daily      Allergies:    Morphine                Hypotension  Prednisone                      PHYSICAL EXAM:   Blood pressure 109/63, pulse 82, temperature 97 / emphysema   Plan nebs   Abx   ICS/LABA   out pt PFTs      Peter Jansen MD

## 2017-02-14 NOTE — PHYSICAL THERAPY NOTE
PHYSICAL THERAPY EVALUATION - INPATIENT     Room Number: 334/334-A  Evaluation Date: 2/14/2017  Type of Evaluation: Initial  Physician Order: PT Eval and Treat    Presenting Problem: Hemoptysis  Reason for Therapy: Mobility Dysfunction and Discharge Pl NEUROLOGICAL FINDINGS  Neurological Findings: None                   ACTIVITY TOLERANCE  O2 Saturation: 90%  O2 Saturation with activity 84%  O2 Device: Nasal cannula  Liters of O2:  4  Shortness of breath  Heart Rate: 84   Patient labored after ambula Demonstrates bed mobility, transfers, and ambulation with stand by assist using rolling walker. Patient with narrow base of support, decreased step length, and forward flexed posture.  Patient visibly labored with breathing after ambulation, requiring full

## 2017-02-14 NOTE — HISTORICAL OFFICE NOTE
Claude Gnosticist  : 1929  ACCOUNT:  [id number]  [phone]  PCP: Dr. Jose C Frank     TODAY'S DATE: 2013  DICTATED BY:  Thedford Boast, M.D.]    CHIEF COMPLAINT: [Followup of CAD, of native vessels, Followup of CHF, left ventricular, Followup of Injection    MEDICATIONS: Selected prescriptions see below    VITAL SIGNS: [B/P - 100/60, Pulse - 72, Respiration - 20, Weight - 179, Height -   71, BMI - 25.0]    CONS: well developed, well nourished. EYES: conjunctivae pink.  ENT: mucosa pink and moist. N lunchtime                             04/04/12 Tylenol Extra Streng  500MG     twice daily                              11/24/09 Calcium 500           500-250-  one daily                                09/18/09 Aspirin Adult Low St  81MG      one daily minute. The mitral valve opens normally. There is no evidence of mitral valve prolapse. There is mitral annular calcification. The aortic valve is thickened with some calcification. The valve appears to open normally.   Three cusps of the aortic valve [phone] [facility] - Diagnostic Test Results    TYPE OF TEST: NUCLEAR STRESS TEST - RAMPED SHWETHA PROTOCOL  Date of Test: 04/15/2011  Test Location: [place]    Patient: Stephanie Alberto: [id number]                        Patient Shaina program was utilized. The post-stress SPECT images demonstrate a large sized defect of severe intensity involving the inferior and inferolateral segments of the left ventricle. This goes from the base to the mid ventricle. Resting images are unchanged.  Sudha Rodney Pressure Response: Normal    TEST CONDUCTED BY: Connor Prieto, Exercise Physiologist  Stress EKG Reviewed By: Jairo Martinez MD  SMR/sd    Jairo Martinez M.D.   [facility]     SR/bd - DD: 04/15/11 - DT: 04/18/11 - Job # [id number] - cc: Vince Cervantes did not develop significant symptoms. The resting electrocardiogram demonstrated ST-T wave changes, poor R wave progression, sinus bradycardia, prior inferior wall myocardial infarction.   The stress ECG portion demonstrated normal and did not show ST-segm

## 2017-02-14 NOTE — PROGRESS NOTES
[facility] Patient Status:  Inpatient    1929 MRN [id number]   Location [facility] 3W/SW Attending Benito Elam MD   Hosp Day # 7 PCP Aleixa Oreilly MD       Assessment and Plan: Encounters:  02/14/17 : 177 lb (80.287 kg)        Exam  Gen: No acute distress, alert and oriented x3,   Neck:supple,no JVD  Pulm: Lungs diminished, mildly dyspneic, wearing O2  CV: Heart with irregular rate and rhythm, nl S1,S2 ,no murmur  Abd: Abdomen so

## 2017-02-14 NOTE — PROGRESS NOTES
[facility]     Progress Note        Sukh Rodriguez Patient Status:  Inpatient    1929 MRN [id number]   Location [facility] 3W/SW Attending Earnest Begum MD   Hosp Day # 7 PCP Mandie Roper MD       Subjective:   Patient se mg 500 mg Oral BID   Fluticasone Furoate-Vilanterol (BREO ELLIPTA) 200-25 MCG/INH inhaler 1 puff 1 puff Inhalation Daily   Umeclidinium Bromide (INCRUSE ELLIPTA) 62.5 MCG/INH inhaler 1 puff 1 puff Inhalation Daily       Continuous Infusions:   • sodium chl effusions, left greater than right with interval progression.  4. Followup to interval resolution         Ct Chest+abdomen+pelvis(all Cntrst Only)(cpt=71260/97497)    2/7/2017  PROCEDURE: CT CHEST ABDOMEN PELVIS (ALL CONTRAST ONLY) (CPT=71260/32839)  COMPAR MEDIASTINUM/SOCRATES: A precarinal node measures 1.3 cm in short axis. CHEST WALL: No axillary mass or lymphadenopathy. Diffuse subcutaneous edema is present. LIVER: No enlargement, atrophy, abnormal density, or significant focal lesion is identified.   BILIAR ventral abdominal laxity. Postoperative changes of the ventral abdominal wall are present. Diffuse subcutaneous edema is appreciated, particularly dependently. OTHER: No free air is seen in the abdomen or pelvis.     Dictated by (CST): Cheo Arce MD on Portable  (cpt=71010)    2/8/2017  PROCEDURE: XR CHEST AP PORTABLE (CPT=71010) TIME: 1708 hrs. .   COMPARISON: [facility], XR CHEST PA + LAT CHEST (CPT=71020), 2/08/2017,  INDICATIONS: Post left thoracentesis.  Bilateral pneumonitis and pleur silhouette is not enlarged. There is mild tortuosity of the thoracic aorta with peripheral atherosclerotic vascular calcification. The pulmonary vascularity is within normal limits. MEDIAST/SOCRATES: No visible mass or adenopathy.  LUNGS/PLEURA: Central pulmona previous pleural effusions or thoracentesis. Pleural fluid transudative and lymphocytic predominant.  -Coumadin on hold at this time given underlying hemoptysis.   Cardiology consultation pending regarding Coumadin reinitiation.  -Scant hemoptysis graduall

## 2017-02-14 NOTE — PROGRESS NOTES
[facility]    Progress Note    Asuncion Conklin Patient Status:  Inpatient    1929 MRN [id number]   Location [facility] 3W/SW Attending Tula Krabbe, MD   Hosp Day # 7 PCP Arely Mccrary MD       SUBJECTIVE:    Feeling bett tab 650 mg 650 mg Oral Q6H PRN   ondansetron HCl (ZOFRAN) injection 4 mg 4 mg Intravenous Q6H PRN   docusate sodium (COLACE) cap 100 mg 100 mg Oral BID   PEG 3350 (MIRALAX) powder packet 17 g 17 g Oral Daily PRN   magnesium hydroxide (MILK OF MAGNESIA) 400 elevated INR and sheer forces of cough.    - see above    Prophylaxis:   DVT with SCDs    Dispo: pending    Greater than 35 minutes spent, >50% spent counseling re: treatment plan and workup      Wes Bennett MD

## 2017-02-14 NOTE — PLAN OF CARE
Spoke with Dr. Jihan Vasquez and informed him that the patient has decided not to have a Bronchoscopy tomorrow. Dr. Kemp Drivers placed patient NPO after midnight in case he has decided to do the procedure.  Dr. Jihan Vasquez gave me the ok to change diet back to previous diet fo

## 2017-02-14 NOTE — DISCHARGE PLANNING
2/14CM-MD orders received in regards to discharge planning.  Eli Beckman was meeting with the Patient and his daughter at bedside and informed this Writer that the Patient is waiting for PT and if recommended for SNF would like to go to [facility]

## 2017-02-15 LAB
ANION GAP SERPL CALC-SCNC: 5 MMOL/L (ref 0–18)
BASOPHILS # BLD: 0.1 K/UL (ref 0–0.2)
BASOPHILS NFR BLD: 1 %
BUN SERPL-MCNC: 14 MG/DL (ref 8–20)
BUN/CREAT SERPL: 10.9 (ref 10–20)
CALCIUM SERPL-MCNC: 8.4 MG/DL (ref 8.5–10.5)
CHLORIDE SERPL-SCNC: 106 MMOL/L (ref 95–110)
CO2 SERPL-SCNC: 29 MMOL/L (ref 22–32)
CREAT SERPL-MCNC: 1.29 MG/DL (ref 0.5–1.5)
EOSINOPHIL # BLD: 0.3 K/UL (ref 0–0.7)
EOSINOPHIL NFR BLD: 4 %
ERYTHROCYTE [DISTWIDTH] IN BLOOD BY AUTOMATED COUNT: 16 % (ref 11–15)
GLUCOSE SERPL-MCNC: 106 MG/DL (ref 70–99)
HCT VFR BLD AUTO: 38.8 % (ref 41–52)
HGB BLD-MCNC: 12.7 G/DL (ref 13.5–17.5)
LYMPHOCYTES # BLD: 1.4 K/UL (ref 1–4)
LYMPHOCYTES NFR BLD: 19 %
MCH RBC QN AUTO: 29.9 PG (ref 27–32)
MCHC RBC AUTO-ENTMCNC: 32.6 G/DL (ref 32–37)
MCV RBC AUTO: 91.7 FL (ref 80–100)
MONOCYTES # BLD: 1 K/UL (ref 0–1)
MONOCYTES NFR BLD: 13 %
NEUTROPHILS # BLD AUTO: 4.8 K/UL (ref 1.8–7.7)
NEUTROPHILS NFR BLD: 63 %
OSMOLALITY UR CALC.SUM OF ELEC: 291 MOSM/KG (ref 275–295)
PLATELET # BLD AUTO: 180 K/UL (ref 140–400)
PMV BLD AUTO: 7.4 FL (ref 7.4–10.3)
POTASSIUM SERPL-SCNC: 4.3 MMOL/L (ref 3.3–5.1)
RBC # BLD AUTO: 4.23 M/UL (ref 4.5–5.9)
SODIUM SERPL-SCNC: 140 MMOL/L (ref 136–144)
WBC # BLD AUTO: 7.7 K/UL (ref 4–11)

## 2017-02-15 PROCEDURE — 99232 SBSQ HOSP IP/OBS MODERATE 35: CPT | Performed by: INTERNAL MEDICINE

## 2017-02-15 PROCEDURE — 99233 SBSQ HOSP IP/OBS HIGH 50: CPT | Performed by: HOSPITALIST

## 2017-02-15 RX ORDER — ZOLPIDEM TARTRATE 5 MG/1
5 TABLET ORAL ONCE
Status: COMPLETED | OUTPATIENT
Start: 2017-02-15 | End: 2017-02-15

## 2017-02-15 NOTE — PROGRESS NOTES
[facility]     Progress Note        Adrian Gresham Patient Status:  Inpatient    1929 MRN [id number]   Location [facility] 3W/SW Attending Gene Sky MD   Hosp Day # 8 PCP Mauricio Yu MD       Subjective:   Patient se mg 500 mg Oral Daily   acetaminophen (TYLENOL EXTRA STRENGTH) tab 500 mg 500 mg Oral BID   Fluticasone Furoate-Vilanterol (BREO ELLIPTA) 200-25 MCG/INH inhaler 1 puff 1 puff Inhalation Daily   Umeclidinium Bromide (INCRUSE ELLIPTA) 62.5 MCG/INH inhaler 1 p M.D. on 2/08/2017 at 10:29     Approved by (CST): Magda Hudson M.D. on 2/08/2017 at 10:33          2/8/2017  CONCLUSION:  1. Mild cardiomegaly. Tortuous aorta. 2. Emphysematous changes.  Extensive bilateral lung consolidation with marked progression effusion with partial loculation is present. There is a small to moderate right pleural effusion. No pneumothorax is detected. AIRWAYS: Mild narrowing of the transverse diameter of the trachea is seen with a slight saber sheath configuration.  Mild bronchi BONES:   Multiapex scoliosis of the thoracolumbar spine. Multilevel degenerative changes of the spine are demonstrated. There is loss of vertebral body height of the mid thoracic vertebrae resulting in accentuation of the thoracic kyphosis.  Multilevel endp Natividad Krishnamurthy. No radiologist was present.   Images obtained show large left effusion with secondary left lung atelectasis and/or partial consolidation  Dictated by (CST): Russ Carson MD on 2/08/2017 at 15:46     Approved by (CST): Russ Carson MD on 2/08/ ROUTINE, 1/17/2016, 20:45. [facility], X CHEST PA LAT ROUTINE, 2/01/2016, 10:56. INDICATIONS: Hemoptysis of acute onset; shortness of breath of the preceding the one month. TECHNIQUE:   Single view.    FINDINGS:  POSITION: The patient is -Continue empiric antibiotic therapy. May discontinue upon discharge.  -Bedside thoracentesis performed with removal of 1500 cc of slightly  blood-tinged pleural fluid.  Awaiting pleural fluid analysis.  No evidence of significant loculated fluid seen.  U

## 2017-02-15 NOTE — PROGRESS NOTES
[facility]    Progress Note    Victor Manuel Camacho Patient Status:  Inpatient    1929 MRN [id number]   Location [facility] 3W/SW Attending Katina Vasquez MD     Hosp Day # 8 PCP Wendy Wilkerson MD       SUBJECTIVE:    Feeling bet infusion  Intravenous Continuous   Piperacillin Sod-Tazobactam So (ZOSYN) 3.375 g in dextrose 5 % 100 mL IVPB 3.375 g Intravenous Q8H   ipratropium-albuterol (DUONEB) nebulizer solution 3 mL 3 mL Nebulization Q6H PRN   guaiFENesin (ROBITUSSIN) 100 MG/5ML s then can stop all abx tomorrow  - still requiring oxygen 4 liters  - desats with movement    Chronic obstructive pulmonary disease.  - cont nebs  - stable for now    Chronic atrial fibrillation.   - monitor on tele  - rate controlled  - held coumadin due to

## 2017-02-15 NOTE — OCCUPATIONAL THERAPY NOTE
OCCUPATIONAL THERAPY EVALUATION - INPATIENT     Room Number: 334/334-A  Evaluation Date: 2/15/2017  Type of Evaluation: Initial  Presenting Problem:  (Hemoptysis)    Physician Order: IP Consult to Occupational Therapy  Reason for Therapy: ADL/IADL Dysfunct walk-in)       Occupation/Status:  (Retired)  Hand Dominance: Right  Drives: Yes  Patient Regularly Uses: Glasses; Home O2    Stairs in Home: Elevator  Use of Assistive Device(s): RW for condo, cane for outdoors, w/c for long distances outdoors    Prior [place] Products Score (AM-PAC Scale): 40.22  CMS Modifier (G-Code): CK    FUNCTIONAL TRANSFER ASSESSMENT  Supine to Sit : Supervision (SBA)  Sit to Stand: Supervision (SBA)    Toilet Transfer: SBA; increased rest breaks needed  Shower Transfer: n/t  Chair Transfer: SBA; i

## 2017-02-15 NOTE — PLAN OF CARE
Maintains optimal cardiac output and hemodynamic stability Progressing      Absence of cardiac arrhythmias or at baseline Progressing    Coumadin continues on hold, afib with heart rate controlled  Achieves optimal ventilation and oxygenation Progressing

## 2017-02-15 NOTE — PROGRESS NOTES
[facility]    Progress Note    Angelyn Schwab Patient Status:  Inpatient    1929 MRN [id number]   Location [facility] 3W/SW Attending Madelaine Tsang MD   Hosp Day # 8 PCP Angeli Aiken MD         Assessment and Plan:      H Inhalation Daily   • Umeclidinium Bromide  1 puff Inhalation Daily             Results:     Lab Results  Component Value Date   WBC 7.7 02/15/2017   HGB 12.7* 02/15/2017   HCT 38.8* 02/15/2017    02/15/2017   CREATSERUM 1.29 02/15/2017   BUN 14 02/1

## 2017-02-16 VITALS
DIASTOLIC BLOOD PRESSURE: 90 MMHG | HEIGHT: 71 IN | OXYGEN SATURATION: 90 % | RESPIRATION RATE: 20 BRPM | SYSTOLIC BLOOD PRESSURE: 143 MMHG | BODY MASS INDEX: 25.11 KG/M2 | WEIGHT: 179.38 LBS | TEMPERATURE: 98 F | HEART RATE: 73 BPM

## 2017-02-16 PROCEDURE — 99232 SBSQ HOSP IP/OBS MODERATE 35: CPT | Performed by: INTERNAL MEDICINE

## 2017-02-16 PROCEDURE — 99239 HOSP IP/OBS DSCHRG MGMT >30: CPT | Performed by: HOSPITALIST

## 2017-02-16 RX ORDER — GUAIFENESIN 100 MG/5ML
200 SOLUTION ORAL EVERY 4 HOURS PRN
Qty: 840 ML | Refills: 0 | Status: SHIPPED | OUTPATIENT
Start: 2017-02-16 | End: 2017-03-02

## 2017-02-16 RX ORDER — IPRATROPIUM BROMIDE AND ALBUTEROL SULFATE 2.5; .5 MG/3ML; MG/3ML
3 SOLUTION RESPIRATORY (INHALATION) EVERY 6 HOURS PRN
Qty: 360 ML | Refills: 0 | Status: SHIPPED | OUTPATIENT
Start: 2017-02-16 | End: 2017-03-18

## 2017-02-16 RX ORDER — ACETAMINOPHEN 325 MG/1
650 TABLET ORAL EVERY 6 HOURS PRN
Qty: 30 TABLET | Refills: 0 | Status: SHIPPED | OUTPATIENT
Start: 2017-02-16

## 2017-02-16 RX ORDER — IPRATROPIUM BROMIDE AND ALBUTEROL SULFATE 2.5; .5 MG/3ML; MG/3ML
3 SOLUTION RESPIRATORY (INHALATION) 2 TIMES DAILY
Qty: 180 ML | Refills: 0 | Status: SHIPPED | OUTPATIENT
Start: 2017-02-16 | End: 2017-03-18

## 2017-02-16 RX ORDER — ZOLPIDEM TARTRATE 5 MG/1
5 TABLET ORAL NIGHTLY PRN
Qty: 10 TABLET | Refills: 0 | Status: ON HOLD | OUTPATIENT
Start: 2017-02-16 | End: 2018-01-01

## 2017-02-16 RX ORDER — POLYETHYLENE GLYCOL 3350 17 G/17G
17 POWDER, FOR SOLUTION ORAL DAILY PRN
Qty: 7 EACH | Refills: 0 | Status: SHIPPED | OUTPATIENT
Start: 2017-02-16 | End: 2017-02-23

## 2017-02-16 RX ORDER — ZOLPIDEM TARTRATE 5 MG/1
5 TABLET ORAL NIGHTLY PRN
Status: DISCONTINUED | OUTPATIENT
Start: 2017-02-16 | End: 2017-02-16

## 2017-02-16 NOTE — PHYSICAL THERAPY NOTE
PHYSICAL THERAPY TREATMENT NOTE - INPATIENT    Room Number: 334/334-A       Presenting Problem: Hemoptysis    Problem List  Principal Problem:    Hemoptysis      ASSESSMENT   RN notified prior to treatment and the pt was received supine in bed and agreeab bedclothes, sheets and blankets)?: None   -   Sitting down on and standing up from a chair with arms (e.g., wheelchair, bedside commode, etc.): A Little   -   Moving from lying on back to sitting on the side of the bed?: None   How much help from another p

## 2017-02-16 NOTE — DISCHARGE PLANNING
2/16/17 CM Discharge planning / MDO transfer to rehab   Updated PT/OT notes faxed to [address] [place] [address]. Spoke with Rachel Delacruz at [address] [place] [address], bed available today at 5:30 pm, RN report [phone], transport arranged via [address] [place] [address], pt is on O24l.   Sp

## 2017-02-16 NOTE — DISCHARGE SUMMARY
[facility] AND [facility]  Discharge Summary    Angelyn Schwab Patient Status:  Inpatient    1929 MRN [id number]   Location [facility] 3W/SW Attending Madelaine Tsang MD   Hosp Day # 9 PCP Angeli Aiken MD     Date of Admission: 2017    Date of other day. aspirin 81 MG Oral Chew Tab  Chew 81 mg by mouth daily. multivitamin Oral Tab  Take 1 tablet by mouth daily. Calcium Carbonate Antacid 600 MG Oral Chew Tab  Chew 600 mg by mouth daily.       STOP taking these medications    Warfarin Sodi on bronch for now    - hold coumadin --> restart 2/19  - still requiring oxygen 4 liters      Chronic obstructive pulmonary disease.  - cont nebs  - stable for now    Chronic atrial fibrillation.   - monitor on tele  - rate controlled  - held coumadin due t

## 2017-02-16 NOTE — PROGRESS NOTES
[facility]    Cardiology Progress Note    Sarah Core Patient Status:  Inpatient    1929 MRN [id number]   Location [facility] 3W/SW Attending Pema Larkin MD   Hosp Day # 9 PCP Honorio Ricci MD     Patient Active Probl K  4.3   CL  106   CO2  29   BUN  14   CREATSERUM  1.29   CA  8.4*   GLU  106*       No results for input(s): ALT, AST, ALB, AMYLASE, LIPASE, LDH in the last 72 hours.     Invalid input(s): ALPHOS, TBIL, DBIL, TPROT    No results for input(s): TROP in the

## 2017-02-17 ENCOUNTER — TELEPHONE (OUTPATIENT)
Dept: PULMONOLOGY | Facility: CLINIC | Age: 82
End: 2017-02-17

## 2017-02-17 NOTE — TELEPHONE ENCOUNTER
Attempted to call pt daughter Nadeem Clarke answered pts cell phone. Annabel notified this office will need to get permission from pt to speak to her. Daughter states pt is at [facility].   Mariposa at [facility] p- [phone] was able to get

## 2017-02-17 NOTE — TELEPHONE ENCOUNTER
Per Dr. Beka Godwin pt does not need a Chest CT prior to appt. Annabel notified of this. Annabel verbalized understanding.

## 2017-03-08 ENCOUNTER — OFFICE VISIT (OUTPATIENT)
Dept: PULMONOLOGY | Facility: CLINIC | Age: 82
End: 2017-03-08

## 2017-03-08 VITALS
RESPIRATION RATE: 18 BRPM | DIASTOLIC BLOOD PRESSURE: 63 MMHG | SYSTOLIC BLOOD PRESSURE: 101 MMHG | OXYGEN SATURATION: 95 % | BODY MASS INDEX: 23.24 KG/M2 | HEIGHT: 71 IN | HEART RATE: 78 BPM | WEIGHT: 166 LBS

## 2017-03-08 DIAGNOSIS — J44.9 CHRONIC OBSTRUCTIVE PULMONARY DISEASE, UNSPECIFIED COPD TYPE (HCC): Primary | ICD-10-CM

## 2017-03-08 DIAGNOSIS — R91.8 LUNG INFILTRATE: ICD-10-CM

## 2017-03-08 DIAGNOSIS — R93.89 ABNORMAL X-RAY: ICD-10-CM

## 2017-03-08 DIAGNOSIS — J90 PLEURAL EFFUSION: ICD-10-CM

## 2017-03-08 PROCEDURE — G0463 HOSPITAL OUTPT CLINIC VISIT: HCPCS | Performed by: INTERNAL MEDICINE

## 2017-03-08 PROCEDURE — 99214 OFFICE O/P EST MOD 30 MIN: CPT | Performed by: INTERNAL MEDICINE

## 2017-03-08 NOTE — PROGRESS NOTES
HPI:    Patient ID: Daniel Lam is a 80year old male.     HPI  Still with significant dyspnea and dyspnea on exertion and not back to baseline prior to extensive bilateral pneumonia in February  Prior to this pneumonia and respiratory failure he used t Disp:  Rfl:    Tiotropium Bromide Monohydrate 18 MCG Inhalation Cap Inhale into the lungs every other day. Disp:  Rfl:    aspirin 81 MG Oral Chew Tab Chew 81 mg by mouth daily. Disp:  Rfl:    multivitamin Oral Tab Take 1 tablet by mouth daily.  Disp:  Rfl: said  At this point my plan :   Repeat CT of the chest to compare to the last CT in February 10, 2017  If there is a significant effusion will perform thoracentesis otherwise will compare and give further recommendation based on repeat CT  Likely no aggres

## 2017-03-17 ENCOUNTER — HOSPITAL ENCOUNTER (OUTPATIENT)
Dept: CT IMAGING | Facility: HOSPITAL | Age: 82
Discharge: HOME | End: 2017-03-17
Attending: INTERNAL MEDICINE
Payer: MEDICARE

## 2017-03-17 DIAGNOSIS — R93.89 ABNORMAL X-RAY: ICD-10-CM

## 2017-03-17 DIAGNOSIS — J90 PLEURAL EFFUSION: ICD-10-CM

## 2017-03-17 DIAGNOSIS — R91.8 LUNG INFILTRATE: ICD-10-CM

## 2017-03-17 PROCEDURE — 71250 CT THORAX DX C-: CPT

## 2017-03-18 NOTE — PROGRESS NOTES
Pt had outpt CT chest today  D/w radiology  Small-tiny L anterior ptx  Plan     will discuss with Dr Kyara Grimm tomorrow   Consider repeat CXR to make sure no progression

## 2017-03-22 ENCOUNTER — TELEPHONE (OUTPATIENT)
Dept: PULMONOLOGY | Facility: CLINIC | Age: 82
End: 2017-03-22

## 2017-03-22 NOTE — TELEPHONE ENCOUNTER
Reassured daughter to keep pt's f/u appt scheduled on 3/24/17 @ 1:30 pm at [place] per MD's CT Chest Result Notes dated 3/20 & Progress Notes dated 3/8. Explained ABILIO should be filled out during pt's appt. She verbalized understanding.

## 2017-03-22 NOTE — TELEPHONE ENCOUNTER
Annalise 37 wants to know if pt. should keeps his 3/24 f/up appt. ? She states that pt. Thinks that he was told not to come in? Please clarify.

## 2017-03-24 ENCOUNTER — LAB REQUISITION (OUTPATIENT)
Dept: LAB | Facility: HOSPITAL | Age: 82
End: 2017-03-24
Payer: MEDICARE

## 2017-03-24 ENCOUNTER — OFFICE VISIT (OUTPATIENT)
Dept: PULMONOLOGY | Facility: CLINIC | Age: 82
End: 2017-03-24

## 2017-03-24 VITALS
HEART RATE: 86 BPM | BODY MASS INDEX: 22.45 KG/M2 | SYSTOLIC BLOOD PRESSURE: 92 MMHG | DIASTOLIC BLOOD PRESSURE: 65 MMHG | OXYGEN SATURATION: 94 % | WEIGHT: 160.38 LBS | HEIGHT: 71 IN | RESPIRATION RATE: 16 BRPM

## 2017-03-24 DIAGNOSIS — Z79.01 LONG TERM CURRENT USE OF ANTICOAGULANT: ICD-10-CM

## 2017-03-24 DIAGNOSIS — J44.9 CHRONIC OBSTRUCTIVE PULMONARY DISEASE (HCC): ICD-10-CM

## 2017-03-24 DIAGNOSIS — J43.1 PANLOBULAR EMPHYSEMA (HCC): Primary | ICD-10-CM

## 2017-03-24 DIAGNOSIS — J90 PLEURAL EFFUSION: ICD-10-CM

## 2017-03-24 DIAGNOSIS — J18.9 PNEUMONIA OF RIGHT LOWER LOBE DUE TO INFECTIOUS ORGANISM: ICD-10-CM

## 2017-03-24 LAB
INR BLD: 4.5 (ref 0.9–1.2)
PROTHROMBIN TIME: 42.9 SECONDS (ref 11.8–14.5)

## 2017-03-24 PROCEDURE — 99214 OFFICE O/P EST MOD 30 MIN: CPT | Performed by: INTERNAL MEDICINE

## 2017-03-24 PROCEDURE — G0463 HOSPITAL OUTPT CLINIC VISIT: HCPCS | Performed by: INTERNAL MEDICINE

## 2017-03-24 PROCEDURE — 85610 PROTHROMBIN TIME: CPT | Performed by: INTERNAL MEDICINE

## 2017-03-24 NOTE — PROGRESS NOTES
HPI:    Patient ID: Woo Glynn is a 80year old male.     HPI  Much better / almost back to baseline as he claimed   No more cough or sob   Good appetite and gaining back some weigth   No f/c   No cough and no cp   No orthopnea or pnd   Review of Syste normal.   Musculoskeletal: He exhibits no edema. Neurological: He is oriented to person, place, and time.               ASSESSMENT/PLAN:   Panlobular emphysema (hcc)  (primary encounter diagnosis)  Pleural effusion  Pneumonia of right lower lobe due to in

## 2017-05-02 ENCOUNTER — LAB REQUISITION (OUTPATIENT)
Dept: LAB | Facility: HOSPITAL | Age: 82
End: 2017-05-02
Payer: MEDICARE

## 2017-05-02 DIAGNOSIS — J44.9 CHRONIC OBSTRUCTIVE PULMONARY DISEASE (HCC): ICD-10-CM

## 2017-05-02 PROCEDURE — 80048 BASIC METABOLIC PNL TOTAL CA: CPT | Performed by: INTERNAL MEDICINE

## 2017-06-22 ENCOUNTER — TELEPHONE (OUTPATIENT)
Dept: PULMONOLOGY | Facility: CLINIC | Age: 82
End: 2017-06-22

## 2017-07-19 ENCOUNTER — HOSPITAL ENCOUNTER (OUTPATIENT)
Dept: CT IMAGING | Age: 82
Discharge: HOME | End: 2017-07-19
Attending: INTERNAL MEDICINE
Payer: MEDICARE

## 2017-07-19 DIAGNOSIS — J90 PLEURAL EFFUSION: ICD-10-CM

## 2017-07-19 DIAGNOSIS — J18.9 PNEUMONIA OF RIGHT LOWER LOBE DUE TO INFECTIOUS ORGANISM: ICD-10-CM

## 2017-07-19 PROCEDURE — 71250 CT THORAX DX C-: CPT | Performed by: INTERNAL MEDICINE

## 2017-07-28 ENCOUNTER — OFFICE VISIT (OUTPATIENT)
Dept: PULMONOLOGY | Facility: CLINIC | Age: 82
End: 2017-07-28

## 2017-07-28 VITALS
RESPIRATION RATE: 19 BRPM | HEART RATE: 75 BPM | WEIGHT: 167.63 LBS | DIASTOLIC BLOOD PRESSURE: 64 MMHG | HEIGHT: 71 IN | OXYGEN SATURATION: 91 % | SYSTOLIC BLOOD PRESSURE: 100 MMHG | BODY MASS INDEX: 23.47 KG/M2

## 2017-07-28 DIAGNOSIS — J90 PLEURAL EFFUSION: ICD-10-CM

## 2017-07-28 DIAGNOSIS — J43.9 PULMONARY EMPHYSEMA, UNSPECIFIED EMPHYSEMA TYPE (HCC): Primary | ICD-10-CM

## 2017-07-28 PROCEDURE — 99214 OFFICE O/P EST MOD 30 MIN: CPT | Performed by: INTERNAL MEDICINE

## 2017-07-28 PROCEDURE — G0463 HOSPITAL OUTPT CLINIC VISIT: HCPCS | Performed by: INTERNAL MEDICINE

## 2018-01-01 ENCOUNTER — OFFICE VISIT (OUTPATIENT)
Dept: OTOLARYNGOLOGY | Facility: CLINIC | Age: 83
End: 2018-01-01

## 2018-01-01 ENCOUNTER — APPOINTMENT (OUTPATIENT)
Dept: GENERAL RADIOLOGY | Facility: HOSPITAL | Age: 83
End: 2018-01-01
Attending: EMERGENCY MEDICINE
Payer: MEDICARE

## 2018-01-01 ENCOUNTER — APPOINTMENT (OUTPATIENT)
Dept: CV DIAGNOSTICS | Facility: HOSPITAL | Age: 83
End: 2018-01-01
Attending: INTERNAL MEDICINE
Payer: MEDICARE

## 2018-01-01 ENCOUNTER — HOSPITAL ENCOUNTER (EMERGENCY)
Facility: HOSPITAL | Age: 83
Discharge: HOME | End: 2018-01-01
Attending: EMERGENCY MEDICINE
Payer: MEDICARE

## 2018-01-01 ENCOUNTER — HOSPITAL ENCOUNTER (OUTPATIENT)
Facility: HOSPITAL | Age: 83
Setting detail: OBSERVATION
LOS: 1 days | Discharge: HOME | End: 2018-01-01
Attending: EMERGENCY MEDICINE | Admitting: HOSPITALIST
Payer: MEDICARE

## 2018-01-01 ENCOUNTER — OFFICE VISIT (OUTPATIENT)
Dept: PULMONOLOGY | Facility: CLINIC | Age: 83
End: 2018-01-01

## 2018-01-01 VITALS
SYSTOLIC BLOOD PRESSURE: 112 MMHG | HEART RATE: 108 BPM | HEIGHT: 71 IN | BODY MASS INDEX: 22.21 KG/M2 | RESPIRATION RATE: 18 BRPM | DIASTOLIC BLOOD PRESSURE: 70 MMHG | WEIGHT: 158.63 LBS

## 2018-01-01 VITALS
SYSTOLIC BLOOD PRESSURE: 89 MMHG | WEIGHT: 160 LBS | TEMPERATURE: 99 F | BODY MASS INDEX: 22.4 KG/M2 | HEIGHT: 71 IN | RESPIRATION RATE: 18 BRPM | OXYGEN SATURATION: 95 % | DIASTOLIC BLOOD PRESSURE: 66 MMHG | HEART RATE: 80 BPM

## 2018-01-01 VITALS
HEART RATE: 81 BPM | SYSTOLIC BLOOD PRESSURE: 106 MMHG | RESPIRATION RATE: 19 BRPM | WEIGHT: 160 LBS | HEIGHT: 71 IN | TEMPERATURE: 98 F | OXYGEN SATURATION: 99 % | DIASTOLIC BLOOD PRESSURE: 74 MMHG | BODY MASS INDEX: 22.4 KG/M2

## 2018-01-01 VITALS
RESPIRATION RATE: 16 BRPM | HEART RATE: 84 BPM | WEIGHT: 159 LBS | DIASTOLIC BLOOD PRESSURE: 65 MMHG | SYSTOLIC BLOOD PRESSURE: 102 MMHG | HEIGHT: 71 IN | OXYGEN SATURATION: 95 % | BODY MASS INDEX: 22.26 KG/M2 | TEMPERATURE: 99 F

## 2018-01-01 VITALS — BODY MASS INDEX: 22 KG/M2 | DIASTOLIC BLOOD PRESSURE: 62 MMHG | WEIGHT: 159 LBS | SYSTOLIC BLOOD PRESSURE: 104 MMHG

## 2018-01-01 VITALS
TEMPERATURE: 99 F | SYSTOLIC BLOOD PRESSURE: 104 MMHG | HEIGHT: 71 IN | WEIGHT: 160 LBS | BODY MASS INDEX: 22.4 KG/M2 | DIASTOLIC BLOOD PRESSURE: 66 MMHG

## 2018-01-01 VITALS
SYSTOLIC BLOOD PRESSURE: 95 MMHG | DIASTOLIC BLOOD PRESSURE: 63 MMHG | BODY MASS INDEX: 22.41 KG/M2 | TEMPERATURE: 97 F | HEIGHT: 71 IN | WEIGHT: 160.06 LBS

## 2018-01-01 DIAGNOSIS — R04.0 EPISTAXIS: Primary | ICD-10-CM

## 2018-01-01 DIAGNOSIS — J43.9 PULMONARY EMPHYSEMA, UNSPECIFIED EMPHYSEMA TYPE (HCC): Primary | ICD-10-CM

## 2018-01-01 DIAGNOSIS — I21.4 NSTEMI (NON-ST ELEVATED MYOCARDIAL INFARCTION) (HCC): Primary | ICD-10-CM

## 2018-01-01 DIAGNOSIS — J90 PLEURAL EFFUSION: ICD-10-CM

## 2018-01-01 PROCEDURE — 71045 X-RAY EXAM CHEST 1 VIEW: CPT | Performed by: EMERGENCY MEDICINE

## 2018-01-01 PROCEDURE — 30901 CONTROL OF NOSEBLEED: CPT | Performed by: OTOLARYNGOLOGY

## 2018-01-01 PROCEDURE — 99222 1ST HOSP IP/OBS MODERATE 55: CPT | Performed by: INTERNAL MEDICINE

## 2018-01-01 PROCEDURE — 36415 COLL VENOUS BLD VENIPUNCTURE: CPT

## 2018-01-01 PROCEDURE — 99213 OFFICE O/P EST LOW 20 MIN: CPT | Performed by: OTOLARYNGOLOGY

## 2018-01-01 PROCEDURE — 93306 TTE W/DOPPLER COMPLETE: CPT | Performed by: INTERNAL MEDICINE

## 2018-01-01 PROCEDURE — 99203 OFFICE O/P NEW LOW 30 MIN: CPT | Performed by: OTOLARYNGOLOGY

## 2018-01-01 PROCEDURE — 99283 EMERGENCY DEPT VISIT LOW MDM: CPT

## 2018-01-01 PROCEDURE — 30903 CONTROL OF NOSEBLEED: CPT

## 2018-01-01 PROCEDURE — 99232 SBSQ HOSP IP/OBS MODERATE 35: CPT | Performed by: INTERNAL MEDICINE

## 2018-01-01 PROCEDURE — 99223 1ST HOSP IP/OBS HIGH 75: CPT | Performed by: HOSPITALIST

## 2018-01-01 PROCEDURE — 85025 COMPLETE CBC W/AUTO DIFF WBC: CPT | Performed by: EMERGENCY MEDICINE

## 2018-01-01 PROCEDURE — 99239 HOSP IP/OBS DSCHRG MGMT >30: CPT | Performed by: HOSPITALIST

## 2018-01-01 PROCEDURE — 99226 SUBSEQUENT OBSERVATION CARE: CPT | Performed by: HOSPITALIST

## 2018-01-01 PROCEDURE — G0463 HOSPITAL OUTPT CLINIC VISIT: HCPCS | Performed by: INTERNAL MEDICINE

## 2018-01-01 PROCEDURE — G0463 HOSPITAL OUTPT CLINIC VISIT: HCPCS | Performed by: OTOLARYNGOLOGY

## 2018-01-01 PROCEDURE — 99214 OFFICE O/P EST MOD 30 MIN: CPT | Performed by: INTERNAL MEDICINE

## 2018-01-01 PROCEDURE — 85610 PROTHROMBIN TIME: CPT | Performed by: EMERGENCY MEDICINE

## 2018-01-01 RX ORDER — RAMIPRIL 2.5 MG/1
5 CAPSULE ORAL DAILY
Status: DISCONTINUED | OUTPATIENT
Start: 2018-01-01 | End: 2018-01-01

## 2018-01-01 RX ORDER — ASPIRIN 81 MG/1
81 TABLET, CHEWABLE ORAL DAILY
Status: DISCONTINUED | OUTPATIENT
Start: 2018-01-01 | End: 2018-01-01

## 2018-01-01 RX ORDER — DOCUSATE SODIUM 100 MG/1
100 CAPSULE, LIQUID FILLED ORAL 2 TIMES DAILY
Status: DISCONTINUED | OUTPATIENT
Start: 2018-01-01 | End: 2018-01-01

## 2018-01-01 RX ORDER — FUROSEMIDE 40 MG/1
40 TABLET ORAL DAILY
Status: DISCONTINUED | OUTPATIENT
Start: 2018-01-01 | End: 2018-01-01

## 2018-01-01 RX ORDER — METHYLPREDNISOLONE SODIUM SUCCINATE 40 MG/ML
40 INJECTION, POWDER, LYOPHILIZED, FOR SOLUTION INTRAMUSCULAR; INTRAVENOUS ONCE
Status: COMPLETED | OUTPATIENT
Start: 2018-01-01 | End: 2018-01-01

## 2018-01-01 RX ORDER — PANTOPRAZOLE SODIUM 40 MG/1
40 TABLET, DELAYED RELEASE ORAL
Qty: 30 TABLET | Refills: 0 | Status: SHIPPED | OUTPATIENT
Start: 2018-01-01 | End: 2018-01-01 | Stop reason: ALTCHOICE

## 2018-01-01 RX ORDER — TRAMADOL HYDROCHLORIDE 50 MG/1
50 TABLET ORAL EVERY 6 HOURS PRN
COMMUNITY

## 2018-01-01 RX ORDER — EZETIMIBE AND SIMVASTATIN 10; 20 MG/1; MG/1
1 TABLET ORAL NIGHTLY
Status: DISCONTINUED | OUTPATIENT
Start: 2018-01-01 | End: 2018-01-01

## 2018-01-01 RX ORDER — BISACODYL 10 MG
10 SUPPOSITORY, RECTAL RECTAL
Status: DISCONTINUED | OUTPATIENT
Start: 2018-01-01 | End: 2018-01-01

## 2018-01-01 RX ORDER — POLYETHYLENE GLYCOL 3350 17 G/17G
17 POWDER, FOR SOLUTION ORAL DAILY PRN
Status: DISCONTINUED | OUTPATIENT
Start: 2018-01-01 | End: 2018-01-01

## 2018-01-01 RX ORDER — ONDANSETRON 2 MG/ML
4 INJECTION INTRAMUSCULAR; INTRAVENOUS EVERY 6 HOURS PRN
Status: DISCONTINUED | OUTPATIENT
Start: 2018-01-01 | End: 2018-01-01

## 2018-01-01 RX ORDER — AMOXICILLIN AND CLAVULANATE POTASSIUM 875; 125 MG/1; MG/1
1 TABLET, FILM COATED ORAL 2 TIMES DAILY
Qty: 14 TABLET | Refills: 0 | Status: SHIPPED | OUTPATIENT
Start: 2018-01-01 | End: 2018-01-01

## 2018-01-01 RX ORDER — IPRATROPIUM BROMIDE AND ALBUTEROL SULFATE 2.5; .5 MG/3ML; MG/3ML
3 SOLUTION RESPIRATORY (INHALATION)
Status: DISCONTINUED | OUTPATIENT
Start: 2018-01-01 | End: 2018-01-01

## 2018-01-01 RX ORDER — GUAIFENESIN/DEXTROMETHORPHAN 100-10MG/5
100 SYRUP ORAL EVERY 4 HOURS PRN
Status: DISCONTINUED | OUTPATIENT
Start: 2018-01-01 | End: 2018-01-01

## 2018-01-01 RX ORDER — SODIUM PHOSPHATE, DIBASIC AND SODIUM PHOSPHATE, MONOBASIC 7; 19 G/133ML; G/133ML
1 ENEMA RECTAL ONCE AS NEEDED
Status: DISCONTINUED | OUTPATIENT
Start: 2018-01-01 | End: 2018-01-01

## 2018-01-01 RX ORDER — SODIUM CHLORIDE 0.9 % (FLUSH) 0.9 %
3 SYRINGE (ML) INJECTION AS NEEDED
Status: DISCONTINUED | OUTPATIENT
Start: 2018-01-01 | End: 2018-01-01

## 2018-01-01 RX ORDER — TRANEXAMIC ACID 100 MG/ML
1000 INJECTION, SOLUTION INTRAVENOUS ONCE
Status: COMPLETED | OUTPATIENT
Start: 2018-01-01 | End: 2018-01-01

## 2018-01-01 RX ORDER — PANTOPRAZOLE SODIUM 40 MG/1
40 TABLET, DELAYED RELEASE ORAL
Status: DISCONTINUED | OUTPATIENT
Start: 2018-01-01 | End: 2018-01-01

## 2018-01-01 RX ORDER — IPRATROPIUM BROMIDE AND ALBUTEROL SULFATE 2.5; .5 MG/3ML; MG/3ML
3 SOLUTION RESPIRATORY (INHALATION) ONCE
Status: COMPLETED | OUTPATIENT
Start: 2018-01-01 | End: 2018-01-01

## 2018-01-01 RX ORDER — DOXEPIN HYDROCHLORIDE 50 MG/1
1 CAPSULE ORAL DAILY
Status: DISCONTINUED | OUTPATIENT
Start: 2018-01-01 | End: 2018-01-01

## 2018-01-01 RX ORDER — RAMIPRIL 2.5 MG/1
2.5 CAPSULE ORAL DAILY
Status: DISCONTINUED | OUTPATIENT
Start: 2018-01-01 | End: 2018-01-01

## 2018-01-01 RX ORDER — METOPROLOL SUCCINATE 25 MG/1
12.5 TABLET, EXTENDED RELEASE ORAL
Status: DISCONTINUED | OUTPATIENT
Start: 2018-01-01 | End: 2018-01-01

## 2018-01-01 RX ORDER — WARFARIN SODIUM 2.5 MG/1
2.5 TABLET ORAL NIGHTLY
Status: DISCONTINUED | OUTPATIENT
Start: 2018-01-01 | End: 2018-01-01

## 2018-01-01 RX ORDER — ACETAMINOPHEN 325 MG/1
650 TABLET ORAL EVERY 6 HOURS PRN
Status: DISCONTINUED | OUTPATIENT
Start: 2018-01-01 | End: 2018-01-01

## 2018-01-01 RX ORDER — METOPROLOL SUCCINATE 25 MG/1
12.5 TABLET, EXTENDED RELEASE ORAL
Qty: 30 TABLET | Refills: 0 | Status: SHIPPED | OUTPATIENT
Start: 2018-01-01

## 2018-01-01 RX ORDER — RAMIPRIL 2.5 MG/1
2.5 CAPSULE ORAL DAILY
Qty: 30 CAPSULE | Refills: 0 | Status: SHIPPED | OUTPATIENT
Start: 2018-01-01 | End: 2018-01-01 | Stop reason: ALTCHOICE

## 2018-01-01 RX ORDER — WARFARIN SODIUM 2.5 MG/1
2.5 TABLET ORAL
COMMUNITY

## 2018-01-02 ENCOUNTER — HOSPITAL ENCOUNTER (INPATIENT)
Facility: HOSPITAL | Age: 83
LOS: 2 days | Discharge: HOME | DRG: 871 | End: 2018-01-04
Admitting: HOSPITALIST
Payer: MEDICARE

## 2018-01-02 ENCOUNTER — APPOINTMENT (OUTPATIENT)
Dept: GENERAL RADIOLOGY | Facility: HOSPITAL | Age: 83
DRG: 871 | End: 2018-01-02
Payer: MEDICARE

## 2018-01-02 DIAGNOSIS — J44.1 COPD EXACERBATION (HCC): Primary | ICD-10-CM

## 2018-01-02 LAB
ALBUMIN SERPL BCP-MCNC: 2.6 G/DL (ref 3.5–4.8)
ALBUMIN/GLOB SERPL: 0.7 {RATIO} (ref 1–2)
ALP SERPL-CCNC: 50 U/L (ref 32–100)
ALT SERPL-CCNC: 22 U/L (ref 17–63)
ANION GAP SERPL CALC-SCNC: 10 MMOL/L (ref 0–18)
ANION GAP SERPL CALC-SCNC: 10 MMOL/L (ref 0–18)
APTT PPP: 45.3 SECONDS (ref 23.2–35.3)
AST SERPL-CCNC: 31 U/L (ref 15–41)
BACTERIA UR QL AUTO: NEGATIVE /HPF
BASOPHILS # BLD: 0.1 K/UL (ref 0–0.2)
BASOPHILS NFR BLD: 1 %
BILIRUB SERPL-MCNC: 1.1 MG/DL (ref 0.3–1.2)
BILIRUB UR QL: NEGATIVE
BNP SERPL-MCNC: 646 PG/ML (ref 0–100)
BUN SERPL-MCNC: 32 MG/DL (ref 8–20)
BUN SERPL-MCNC: 35 MG/DL (ref 8–20)
BUN/CREAT SERPL: 22.4 (ref 10–20)
BUN/CREAT SERPL: 23.3 (ref 10–20)
CALCIUM SERPL-MCNC: 8.3 MG/DL (ref 8.5–10.5)
CALCIUM SERPL-MCNC: 8.5 MG/DL (ref 8.5–10.5)
CHLORIDE SERPL-SCNC: 101 MMOL/L (ref 95–110)
CHLORIDE SERPL-SCNC: 99 MMOL/L (ref 95–110)
CLARITY UR: CLEAR
CO2 SERPL-SCNC: 30 MMOL/L (ref 22–32)
CO2 SERPL-SCNC: 31 MMOL/L (ref 22–32)
COLOR UR: YELLOW
CREAT SERPL-MCNC: 1.43 MG/DL (ref 0.5–1.5)
CREAT SERPL-MCNC: 1.5 MG/DL (ref 0.5–1.5)
EOSINOPHIL # BLD: 0 K/UL (ref 0–0.7)
EOSINOPHIL NFR BLD: 0 %
ERYTHROCYTE [DISTWIDTH] IN BLOOD BY AUTOMATED COUNT: 15.5 % (ref 11–15)
FLUAV + FLUBV RNA SPEC NAA+PROBE: NEGATIVE
GLOBULIN PLAS-MCNC: 3.8 G/DL (ref 2.5–3.7)
GLUCOSE SERPL-MCNC: 153 MG/DL (ref 70–99)
GLUCOSE SERPL-MCNC: 202 MG/DL (ref 70–99)
GLUCOSE UR-MCNC: NEGATIVE MG/DL
HCT VFR BLD AUTO: 42.7 % (ref 41–52)
HGB BLD-MCNC: 14 G/DL (ref 13.5–17.5)
HYALINE CASTS #/AREA URNS AUTO: 1 /LPF
INR BLD: 3.5 (ref 0.9–1.2)
INR BLD: 3.7 (ref 0.9–1.2)
KETONES UR-MCNC: NEGATIVE MG/DL
LACTATE SERPL-SCNC: 1.7 MMOL/L (ref 0.5–2.2)
LEUKOCYTE ESTERASE UR QL STRIP.AUTO: NEGATIVE
LYMPHOCYTES # BLD: 0.6 K/UL (ref 1–4)
LYMPHOCYTES NFR BLD: 6 %
MCH RBC QN AUTO: 29.4 PG (ref 27–32)
MCHC RBC AUTO-ENTMCNC: 32.8 G/DL (ref 32–37)
MCV RBC AUTO: 89.8 FL (ref 80–100)
MONOCYTES # BLD: 1.6 K/UL (ref 0–1)
MONOCYTES NFR BLD: 16 %
NEUTROPHILS # BLD AUTO: 7.6 K/UL (ref 1.8–7.7)
NEUTROPHILS NFR BLD: 77 %
NITRITE UR QL STRIP.AUTO: NEGATIVE
OSMOLALITY UR CALC.SUM OF ELEC: 302 MOSM/KG (ref 275–295)
OSMOLALITY UR CALC.SUM OF ELEC: 304 MOSM/KG (ref 275–295)
PH UR: 5 [PH] (ref 5–8)
PLATELET # BLD AUTO: 214 K/UL (ref 140–400)
PMV BLD AUTO: 7.4 FL (ref 7.4–10.3)
POTASSIUM SERPL-SCNC: 3.7 MMOL/L (ref 3.3–5.1)
POTASSIUM SERPL-SCNC: 3.9 MMOL/L (ref 3.3–5.1)
PROT SERPL-MCNC: 6.4 G/DL (ref 5.9–8.4)
PROT UR-MCNC: NEGATIVE MG/DL
PROTHROMBIN TIME: 34.8 SECONDS (ref 11.8–14.5)
PROTHROMBIN TIME: 36.2 SECONDS (ref 11.8–14.5)
RBC # BLD AUTO: 4.76 M/UL (ref 4.5–5.9)
RBC #/AREA URNS AUTO: 6 /HPF
SODIUM SERPL-SCNC: 140 MMOL/L (ref 136–144)
SODIUM SERPL-SCNC: 141 MMOL/L (ref 136–144)
SP GR UR STRIP: 1.02 (ref 1–1.03)
UROBILINOGEN UR STRIP-ACNC: 2
VIT C UR-MCNC: NEGATIVE MG/DL
WBC # BLD AUTO: 9.9 K/UL (ref 4–11)
WBC #/AREA URNS AUTO: 1 /HPF

## 2018-01-02 PROCEDURE — 99223 1ST HOSP IP/OBS HIGH 75: CPT | Performed by: INTERNAL MEDICINE

## 2018-01-02 PROCEDURE — 99223 1ST HOSP IP/OBS HIGH 75: CPT | Performed by: HOSPITALIST

## 2018-01-02 PROCEDURE — 71045 X-RAY EXAM CHEST 1 VIEW: CPT

## 2018-01-02 RX ORDER — ALBUTEROL SULFATE 2.5 MG/3ML
2.5 SOLUTION RESPIRATORY (INHALATION) EVERY 4 HOURS PRN
COMMUNITY
End: 2018-01-01 | Stop reason: ALTCHOICE

## 2018-01-02 RX ORDER — RAMIPRIL 5 MG/1
5 CAPSULE ORAL DAILY
Status: DISCONTINUED | OUTPATIENT
Start: 2018-01-02 | End: 2018-01-04

## 2018-01-02 RX ORDER — IPRATROPIUM BROMIDE AND ALBUTEROL SULFATE 2.5; .5 MG/3ML; MG/3ML
3 SOLUTION RESPIRATORY (INHALATION) EVERY 6 HOURS PRN
Status: DISCONTINUED | OUTPATIENT
Start: 2018-01-02 | End: 2018-01-04

## 2018-01-02 RX ORDER — FUROSEMIDE 40 MG/1
40 TABLET ORAL DAILY
Status: DISCONTINUED | OUTPATIENT
Start: 2018-01-02 | End: 2018-01-04

## 2018-01-02 RX ORDER — SODIUM CHLORIDE 9 MG/ML
INJECTION, SOLUTION INTRAVENOUS
Status: DISPENSED
Start: 2018-01-02 | End: 2018-01-03

## 2018-01-02 RX ORDER — WARFARIN SODIUM 5 MG/1
5 TABLET ORAL NIGHTLY
Status: ON HOLD | COMMUNITY
End: 2018-01-04

## 2018-01-02 RX ORDER — SODIUM CHLORIDE 9 MG/ML
INJECTION, SOLUTION INTRAVENOUS CONTINUOUS
Status: DISCONTINUED | OUTPATIENT
Start: 2018-01-02 | End: 2018-01-04

## 2018-01-02 RX ORDER — ACETAMINOPHEN 325 MG/1
650 TABLET ORAL ONCE
Status: COMPLETED | OUTPATIENT
Start: 2018-01-02 | End: 2018-01-02

## 2018-01-02 RX ORDER — ASPIRIN 81 MG/1
81 TABLET, CHEWABLE ORAL DAILY
Status: DISCONTINUED | OUTPATIENT
Start: 2018-01-02 | End: 2018-01-04

## 2018-01-02 RX ORDER — SODIUM CHLORIDE 0.9 % (FLUSH) 0.9 %
3 SYRINGE (ML) INJECTION AS NEEDED
Status: DISCONTINUED | OUTPATIENT
Start: 2018-01-02 | End: 2018-01-04

## 2018-01-02 RX ORDER — METHYLPREDNISOLONE SODIUM SUCCINATE 40 MG/ML
40 INJECTION, POWDER, LYOPHILIZED, FOR SOLUTION INTRAMUSCULAR; INTRAVENOUS ONCE
Status: COMPLETED | OUTPATIENT
Start: 2018-01-02 | End: 2018-01-02

## 2018-01-02 RX ORDER — WARFARIN SODIUM 5 MG/1
5 TABLET ORAL NIGHTLY
Status: DISCONTINUED | OUTPATIENT
Start: 2018-01-02 | End: 2018-01-03

## 2018-01-02 RX ORDER — EZETIMIBE AND SIMVASTATIN 10; 20 MG/1; MG/1
1 TABLET ORAL NIGHTLY
Status: DISCONTINUED | OUTPATIENT
Start: 2018-01-02 | End: 2018-01-04

## 2018-01-02 RX ORDER — ALBUTEROL SULFATE 2.5 MG/3ML
2.5 SOLUTION RESPIRATORY (INHALATION) EVERY 4 HOURS PRN
Status: DISCONTINUED | OUTPATIENT
Start: 2018-01-02 | End: 2018-01-04

## 2018-01-02 NOTE — ED INITIAL ASSESSMENT (HPI)
Pt to ER with c/o productive moist cough x3 weeks. Green sputum noted. Pt hx of COPD and wears 2.5 L O2 at home. No respiratory distress noted. Pt able to speak in full sentences.

## 2018-01-03 ENCOUNTER — APPOINTMENT (OUTPATIENT)
Dept: GENERAL RADIOLOGY | Facility: HOSPITAL | Age: 83
DRG: 871 | End: 2018-01-03
Attending: INTERNAL MEDICINE
Payer: MEDICARE

## 2018-01-03 LAB
ADENOVIRUS PCR:: NEGATIVE
ALBUMIN SERPL BCP-MCNC: 2.5 G/DL (ref 3.5–4.8)
ALBUMIN/GLOB SERPL: 0.6 {RATIO} (ref 1–2)
ALP SERPL-CCNC: 55 U/L (ref 32–100)
ALT SERPL-CCNC: 26 U/L (ref 17–63)
ANION GAP SERPL CALC-SCNC: 9 MMOL/L (ref 0–18)
AST SERPL-CCNC: 46 U/L (ref 15–41)
B PERT DNA SPEC QL NAA+PROBE: NEGATIVE
BASOPHILS # BLD: 0 K/UL (ref 0–0.2)
BASOPHILS NFR BLD: 0 %
BILIRUB SERPL-MCNC: 0.8 MG/DL (ref 0.3–1.2)
BUN SERPL-MCNC: 35 MG/DL (ref 8–20)
BUN/CREAT SERPL: 23.3 (ref 10–20)
C PNEUM DNA SPEC QL NAA+PROBE: NEGATIVE
CALCIUM SERPL-MCNC: 8 MG/DL (ref 8.5–10.5)
CHLORIDE SERPL-SCNC: 101 MMOL/L (ref 95–110)
CO2 SERPL-SCNC: 27 MMOL/L (ref 22–32)
CORONAVIRUS 229E PCR:: NEGATIVE
CORONAVIRUS HKU1 PCR:: NEGATIVE
CORONAVIRUS NL63 PCR:: NEGATIVE
CORONAVIRUS OC43 PCR:: NEGATIVE
CREAT SERPL-MCNC: 1.5 MG/DL (ref 0.5–1.5)
EOSINOPHIL # BLD: 0 K/UL (ref 0–0.7)
EOSINOPHIL NFR BLD: 0 %
ERYTHROCYTE [DISTWIDTH] IN BLOOD BY AUTOMATED COUNT: 14.9 % (ref 11–15)
FLUAV RNA SPEC QL NAA+PROBE: NEGATIVE
FLUBV RNA SPEC QL NAA+PROBE: NEGATIVE
GLOBULIN PLAS-MCNC: 4 G/DL (ref 2.5–3.7)
GLUCOSE SERPL-MCNC: 191 MG/DL (ref 70–99)
HCT VFR BLD AUTO: 41.7 % (ref 41–52)
HGB BLD-MCNC: 13.5 G/DL (ref 13.5–17.5)
INR BLD: 3.7 (ref 0.9–1.2)
LACTATE SERPL-SCNC: 2.1 MMOL/L (ref 0.5–2.2)
LACTATE SERPL-SCNC: 2.2 MMOL/L (ref 0.5–2.2)
LYMPHOCYTES # BLD: 0.3 K/UL (ref 1–4)
LYMPHOCYTES NFR BLD: 4 %
MCH RBC QN AUTO: 29.2 PG (ref 27–32)
MCHC RBC AUTO-ENTMCNC: 32.4 G/DL (ref 32–37)
MCV RBC AUTO: 90.2 FL (ref 80–100)
METAPNEUMOVIRUS PCR:: NEGATIVE
MONOCYTES # BLD: 0.6 K/UL (ref 0–1)
MONOCYTES NFR BLD: 7 %
MYCOPLASMA PNEUMONIA PCR:: NEGATIVE
NEUTROPHILS # BLD AUTO: 7.6 K/UL (ref 1.8–7.7)
NEUTROPHILS NFR BLD: 89 %
OSMOLALITY UR CALC.SUM OF ELEC: 297 MOSM/KG (ref 275–295)
PARAINFLUENZA 1 PCR:: NEGATIVE
PARAINFLUENZA 2 PCR:: NEGATIVE
PARAINFLUENZA 3 PCR:: NEGATIVE
PARAINFLUENZA 4 PCR:: NEGATIVE
PLATELET # BLD AUTO: 245 K/UL (ref 140–400)
PMV BLD AUTO: 7.7 FL (ref 7.4–10.3)
POTASSIUM SERPL-SCNC: 3.3 MMOL/L (ref 3.3–5.1)
PROT SERPL-MCNC: 6.5 G/DL (ref 5.9–8.4)
PROTHROMBIN TIME: 36.3 SECONDS (ref 11.8–14.5)
RBC # BLD AUTO: 4.62 M/UL (ref 4.5–5.9)
RHINOVIRUS/ENTERO PCR:: NEGATIVE
RSV RNA SPEC QL NAA+PROBE: NEGATIVE
SODIUM SERPL-SCNC: 137 MMOL/L (ref 136–144)
WBC # BLD AUTO: 8.5 K/UL (ref 4–11)

## 2018-01-03 PROCEDURE — 99233 SBSQ HOSP IP/OBS HIGH 50: CPT | Performed by: HOSPITALIST

## 2018-01-03 PROCEDURE — 71045 X-RAY EXAM CHEST 1 VIEW: CPT | Performed by: INTERNAL MEDICINE

## 2018-01-03 RX ORDER — FLUTICASONE PROPIONATE 50 MCG
2 SPRAY, SUSPENSION (ML) NASAL 2 TIMES DAILY
Status: DISCONTINUED | OUTPATIENT
Start: 2018-01-03 | End: 2018-01-04

## 2018-01-03 NOTE — PROGRESS NOTES
[facility]    Progress Note      Assessment and Plan:    1. Acute exacerbation of COPD–the patient has cough with chest congestion, low-grade fever. He clearly has a viral or bacterial bronchitis.   He had psychosis with longer course of h [facility]  Medical Director, [facility]  Pager: [phone]

## 2018-01-03 NOTE — H&P
[address] Patient Status:  Inpatient    1929 MRN [id number]   Location [facility] 4W/SW/SE Attending Fanta Sellers MD   Hosp Day # 0 PCP Myrna Knight MD     Date:  2018  Date o Sig: Chew 600 mg by mouth daily. Ezetimibe-Simvastatin (VYTORIN) 10-20 MG Oral Tab 1/1/2018 at Unknown time  Yes Yes   Sig: Take 1 tablet by mouth nightly.    Warfarin Sodium 5 MG Oral Tab 1/1/2018 at Unknown time  Yes Yes   Sig: Take 5 mg by mouth nigh moist.  Head:  Normocephalic, atraumatic. Neck:  Supple, non-tender, no carotid bruit, no jugular venous distention, no lymphadenopathy, no thyromegaly.   Respiratory:  Lungs are clear to auscultation, respirations are non-labored, breath sounds are equal, statin    Prophylaxis  Therapeutic INR on Coumadin    CODE STATUS  Full    Primary care physician  Irineo Marshall MD    Disposition  Clinical course will dictate outcome      Dania Vazquez MD  1/2/2018  9:11 PM

## 2018-01-03 NOTE — SEPSIS REASSESSMENT
[facility]    Sepsis Reassessment Note    BP 94/60   Pulse 88   Temp 98.1 °F (36.7 °C) (Oral)   Resp 29   Ht 5' 11\" (1.803 m)   Wt 170 lb (77.1 kg)   SpO2 94%   BMI 23.71 kg/m²      7:04 PM    Cardiac:  Regularity: Irregular  Rate: Angie Horowitz

## 2018-01-03 NOTE — PROGRESS NOTES
[facility]    Progress Note    [place] Domonique Patient Status:  Inpatient    1929 MRN [id number]   Location [facility] 4W/SW/SE Attending Chon Tristan MD   Hosp Day # 1 PCP Felix Bowser MD     Subjective:   Subjective diabetes with associated nephropathy  - continue accuchecks qachs supplement with SS    Hypotension  - will continue IVF at thistime  - holding po antihypertensives      Chronic atrial fibrillation with rapid ventricular rate  - will continue to monitor at on the clinical documentation in H+P. Based on patients current state of illness, I anticipate that, after discharge, patient will require TBD.         Rocio Baca MD  1/3/2018

## 2018-01-03 NOTE — ED NOTES
Pt safe to transport to floor per MD. Report given to Radha Crane RN. Pt able to eat independently after set up.

## 2018-01-03 NOTE — CONSULTS
[facility] - [facility]    Consult Note    Date:  1/2/2018  Date of Admission:  1/2/2018      Chief Complaint:   Kevin Brunner is a(n) 80year old male with dyspnea.     HPI:   The patient has a history of end-stage COPD having smoked 2 packs per day a Allergies/Medications: Allergies:   Morphine                Hypotension  Prednisone                  (Not in a hospital admission)    Review of Systems:   Vision normal. Ear nose and throat normal except hard of hearing.  Bowel normal. Bladder functio dose Solu-Medrol 40 mg now  2. Zosyn  3. Will use sepsis protocol  4. Serial lactic acid  5. Nebulizer  6. Direct viral respiratory panel for flu or other viruses  7. Will follow clinically.     2.  DVT prophylaxis–subcutaneous heparin    I am delight

## 2018-01-03 NOTE — PLAN OF CARE
Patient/Family Goals    • Patient/Family Long Term Goal Progressing    • Patient/Family Short Term Goal Progressing        RESPIRATORY - ADULT    • Achieves optimal ventilation and oxygenation Progressing        Pt admitted for copd exacerbation.   Cough co

## 2018-01-04 VITALS
WEIGHT: 170 LBS | RESPIRATION RATE: 18 BRPM | DIASTOLIC BLOOD PRESSURE: 76 MMHG | BODY MASS INDEX: 23.8 KG/M2 | OXYGEN SATURATION: 98 % | HEIGHT: 71 IN | SYSTOLIC BLOOD PRESSURE: 107 MMHG | TEMPERATURE: 98 F | HEART RATE: 89 BPM

## 2018-01-04 LAB
ANION GAP SERPL CALC-SCNC: 7 MMOL/L (ref 0–18)
BASOPHILS # BLD: 0 K/UL (ref 0–0.2)
BASOPHILS NFR BLD: 0 %
BUN SERPL-MCNC: 29 MG/DL (ref 8–20)
BUN/CREAT SERPL: 24.4 (ref 10–20)
CALCIUM SERPL-MCNC: 8.1 MG/DL (ref 8.5–10.5)
CHLORIDE SERPL-SCNC: 105 MMOL/L (ref 95–110)
CO2 SERPL-SCNC: 29 MMOL/L (ref 22–32)
CREAT SERPL-MCNC: 1.19 MG/DL (ref 0.5–1.5)
EOSINOPHIL # BLD: 0 K/UL (ref 0–0.7)
EOSINOPHIL NFR BLD: 0 %
ERYTHROCYTE [DISTWIDTH] IN BLOOD BY AUTOMATED COUNT: 15.4 % (ref 11–15)
GLUCOSE SERPL-MCNC: 132 MG/DL (ref 70–99)
HCT VFR BLD AUTO: 38.8 % (ref 41–52)
HGB BLD-MCNC: 12.6 G/DL (ref 13.5–17.5)
INR BLD: 4.4 (ref 0.9–1.2)
LYMPHOCYTES # BLD: 0.7 K/UL (ref 1–4)
LYMPHOCYTES NFR BLD: 7 %
MAGNESIUM SERPL-MCNC: 2 MG/DL (ref 1.8–2.5)
MCH RBC QN AUTO: 29.7 PG (ref 27–32)
MCHC RBC AUTO-ENTMCNC: 32.4 G/DL (ref 32–37)
MCV RBC AUTO: 91.6 FL (ref 80–100)
MONOCYTES # BLD: 1.2 K/UL (ref 0–1)
MONOCYTES NFR BLD: 12 %
NEUTROPHILS # BLD AUTO: 8.4 K/UL (ref 1.8–7.7)
NEUTROPHILS NFR BLD: 81 %
OSMOLALITY UR CALC.SUM OF ELEC: 300 MOSM/KG (ref 275–295)
PHOSPHATE SERPL-MCNC: 2.6 MG/DL (ref 2.4–4.7)
PLATELET # BLD AUTO: 294 K/UL (ref 140–400)
PMV BLD AUTO: 7.8 FL (ref 7.4–10.3)
POTASSIUM SERPL-SCNC: 4.7 MMOL/L (ref 3.3–5.1)
POTASSIUM SERPL-SCNC: 4.7 MMOL/L (ref 3.3–5.1)
PROTHROMBIN TIME: 41.7 SECONDS (ref 11.8–14.5)
RBC # BLD AUTO: 4.24 M/UL (ref 4.5–5.9)
SODIUM SERPL-SCNC: 141 MMOL/L (ref 136–144)
WBC # BLD AUTO: 10.3 K/UL (ref 4–11)

## 2018-01-04 PROCEDURE — 99239 HOSP IP/OBS DSCHRG MGMT >30: CPT | Performed by: HOSPITALIST

## 2018-01-04 PROCEDURE — 99232 SBSQ HOSP IP/OBS MODERATE 35: CPT | Performed by: INTERNAL MEDICINE

## 2018-01-04 RX ORDER — WARFARIN SODIUM 5 MG/1
5 TABLET ORAL NIGHTLY
Qty: 10 TABLET | Refills: 0 | Status: SHIPPED | OUTPATIENT
Start: 2018-01-04

## 2018-01-04 RX ORDER — AMOXICILLIN AND CLAVULANATE POTASSIUM 875; 125 MG/1; MG/1
1 TABLET, FILM COATED ORAL 2 TIMES DAILY
Qty: 14 TABLET | Refills: 0 | Status: SHIPPED | OUTPATIENT
Start: 2018-01-04 | End: 2018-01-01 | Stop reason: ALTCHOICE

## 2018-01-04 NOTE — DISCHARGE SUMMARY
Lv Gavin 44 NAME: Clifford Jimenez   ATTENDING PHYSICIAN: Gerry Aiken MD   PATIENT ACCOUNT#:   [id number]    LOCATION:  [address] [place] [address] Road RECORD #:   [id number]       YOB: 1929  ADMISSION DATE:       01/02 physician within 1 week as well. The patient may resume his Coumadin once his INR levels are therapeutic. The above information was given to him and he verbalized understanding of the information given.   For detailed review regarding the patient's hospit

## 2018-01-04 NOTE — PROGRESS NOTES
[facility]    Progress Note      Assessment and Plan:    1. Acute exacerbation of COPD– much improved and the patient is anxious to go home.     Recommendations:  1.  Okay to home with a short course of prednisone and antibiotic.   2.  Dolly Pleva

## 2018-01-05 ENCOUNTER — TELEPHONE (OUTPATIENT)
Dept: MEDSURG UNIT | Facility: HOSPITAL | Age: 83
End: 2018-01-05

## 2018-01-08 ENCOUNTER — TELEPHONE (OUTPATIENT)
Dept: PULMONOLOGY | Facility: CLINIC | Age: 83
End: 2018-01-08

## 2018-01-08 NOTE — TELEPHONE ENCOUNTER
Pt was told to make hosp f/u appt for 1/18- no openings - pt already has appt for 2/2- can he just come in then ?

## 2018-01-31 NOTE — ED PROVIDER NOTES
Patient Seen in: [facility] 4w/sw/se    History   Patient presents with:  Dyspnea AMBROSE SOB (respiratory)    Stated Complaint: COPD exacerbation ([address]    HPI  Patient is an 81 y/o male who presents to the emergency room with shortness of breath.  He normal heart sounds. Pulmonary/Chest: He is in respiratory distress. He has wheezes. He has rhonchi. Abdominal: Soft. Normal appearance and bowel sounds are normal. He exhibits no distension. There is no tenderness. There is no rebound.    Musculoskele following:     Glucose 191 (*)     BUN 35 (*)     Calcium, Total 8.0 (*)     AST 46 (*)     Albumin 2.5 (*)     Globulin 4.0 (*)     A/G Ratio 0.6 (*)     BUN/CREA Ratio 23.3 (*)     Calculated Osmolality 297 (*)     GFR, Non- 44 (*)     GF ---------                               -----------         ------                     CBC W/ DIFFERENTIAL[[id number]]          Abnormal            Final result                 Please view results for these tests on the individual orders.    CBC WITH DIFFE [facility] [id number]  [phone]    Declined COPD follow up appointment in [address] [place] [address]. Please call [phone] with any questions.     Yu Stuart MD  [facility] [place]  [id number] [phone]    In 2 weeks   see dr Rica Meier in

## 2018-02-02 NOTE — PROGRESS NOTES
HPI:    Patient ID: Woo Glynn is a 80year old male.     HPI  On 3 L nc   About baseline /lives with his daughter / poor baseline     Had recent hospitalization with ECOPD   Cough / congestion - chronic now - about baseline   Stopped inhalers / not he Right eye exhibits no discharge. Left eye exhibits no discharge. No scleral icterus. Neck: Neck supple. No JVD present. Cardiovascular: Normal rate. Exam reveals no gallop and no friction rub. Pulmonary/Chest: He has no wheezes. He has no rales.

## 2018-03-12 ENCOUNTER — PRIOR ORIGINAL RECORDS (OUTPATIENT)
Dept: OTHER | Age: 83
End: 2018-03-12

## 2018-03-12 PROBLEM — I21.4 NSTEMI (NON-ST ELEVATED MYOCARDIAL INFARCTION) (HCC): Status: ACTIVE | Noted: 2018-01-01

## 2018-03-12 NOTE — ED PROVIDER NOTES
Patient Seen in: [facility] AND [facility] Emergency Department    History   Patient presents with:  Chest Pain Angina (cardiovascular)    Stated Complaint:     HPI    80-year-old male presents for complaint of chest pain starting several hours prior to arrival. None (Room air)    Current:/76   Pulse 90   Temp 98.5 °F (36.9 °C) (Oral)   Resp 25   Ht 180.3 cm (5' 11\")   Wt 77.1 kg   SpO2 100%   BMI 23.71 kg/m²         Physical Exam   Constitutional: He is oriented to person, place, and time.  He appears well- components within normal limits   CBC WITH DIFFERENTIAL WITH PLATELET    Narrative: The following orders were created for panel order CBC WITH DIFFERENTIAL WITH PLATELET.   Procedure                               Abnormality         Status CARDIAC/VASC: There is cardiomegaly, but left heart borders are obscure. Pulmonary vascularity is distinct. MEDIAST/SOCRATES:   No visible mass or adenopathy. LUNGS/PLEURA: Emphysematous change, with chronic interstitial scar.  No significant change in the left

## 2018-03-12 NOTE — H&P
[address] Patient Status:  Inpatient    1929 MRN [id number]   Location [facility] 3W/SW Attending Tavo Mora MD   Hosp Day # 0 PCP Dale Luke MD     Date:  3/12/2018  Date of Warfarin Sodium 2.5 MG Oral Tab Take 2.5 mg by mouth 3 (three) times a week. Tiotropium Bromide Monohydrate 18 MCG Inhalation Cap Inhale 18 mcg into the lungs daily. Warfarin Sodium 5 MG Oral Tab Take 1 tablet (5 mg total) by mouth nightly.  (Patient 46 (H) 01/03/2018   ALT 26 01/03/2018   PTT 45.3 (H) 01/02/2018   INR 2.2 (H) 03/12/2018   MG 2.0 01/04/2018   PHOS 2.6 01/04/2018   TROP 0.16 (HH) 03/12/2018       Xr Chest Ap Portable  (cpt=71045)    Result Date: 3/12/2018  CONCLUSION: No change in the a

## 2018-03-12 NOTE — HISTORICAL OFFICE NOTE
Donny Janie  : 1929  ACCOUNT:  [id number]  [phone]  PCP: Dr. Krista Aranda     TODAY'S DATE: 2013  DICTATED BY:  Dania Vazquez M.D.]    CHIEF COMPLAINT: [Followup of CAD, of native vessels, Followup of CHF, left ventricular, Followup of Injection    MEDICATIONS: Selected prescriptions see below    VITAL SIGNS: [B/P - 100/60, Pulse - 72, Respiration - 20, Weight - 179, Height -   71, BMI - 25.0]    CONS: well developed, well nourished. EYES: conjunctivae pink.  ENT: mucosa pink and moist. N lunchtime                             04/04/12 Tylenol Extra Streng  500MG     twice daily                              11/24/09 Calcium 500           500-250-  one daily                                09/18/09 Aspirin Adult Low St  81MG      one daily weight is 81.82 kg. Date of dictation and date of expected discharge is January 22nd.       DAGO Kim  SE:low  DD: 01/22/2016; DT: 01/25/2016  Job #: [id number]

## 2018-03-12 NOTE — PLAN OF CARE
Patient/Family Goals    • Patient/Family Long Term Goal Progressing    • Patient/Family Short Term Goal Progressing    Discussed     Cardiology and pulmonology consult ordered. Plan to discharge to home with family support one clinically appropriate.

## 2018-03-12 NOTE — CONSULTS
[facility]    Report of Consultation    Rocio Peer Patient Status:  Emergency    1929 MRN [id number]   Location [address]ending [address]  Halima Benjamin Day # 0 PCP MD JAYSON Dunlap [facility])    • COPD (chronic obstructive pulmonary disease) ([facility]     uses home oxygen   • Heart attack ([facility]    • Hemoptysis    • High blood pressure      History reviewed. No pertinent surgical history.   Family History   Problem Relation Age of Onset   • Hea AST 46 (H) 01/03/2018   ALT 26 01/03/2018   PTT 45.3 (H) 01/02/2018   INR 2.2 (H) 03/12/2018   MG 2.0 01/04/2018   PHOS 2.6 01/04/2018   TROP 0.16 (HH) 03/12/2018     Xr Chest Ap Portable  (cpt=71045)    Result Date: 3/12/2018  CONCLUSION: No change in t insufficiency  PCP    Elevated glucose  Per PCP    Allyson Flores MD  3/12/2018    -Pt seen for f/u serial troponins reviewed. ECHO pending  -Pt is feeling better since admission.  CP associated with deep breath otherwise stable- cont medical therapy low do

## 2018-03-12 NOTE — CM/SW NOTE
+BPCI Readmission   Patient is a BPCI readmission previously enrolled on 1/4/2018 under  with 22 days left in the BPCI episode which will end on 4/3/2018.

## 2018-03-13 NOTE — PROGRESS NOTES
[facility]    Cardiology Progress Note  [facility]    Fernandez Santoschristina Patient Status:  Inpatient    1929 MRN [id number]   Location [facility] 3W/SW Attending Terese Magaña MD   Hosp Day # 1 PCP [facility] 03/13/2018   BUN 24 (H) 03/13/2018    03/13/2018   K 4.2 03/13/2018    03/13/2018   CO2 27 03/13/2018    (H) 03/13/2018   CA 8.2 (L) 03/13/2018   ALB 2.5 (L) 01/03/2018   ALKPHO 55 01/03/2018   BILT 0.8 01/03/2018   TP 6.5 01/03/2018   A

## 2018-03-13 NOTE — PROGRESS NOTES
[facility]    Progress Note      Assessment and Plan:   1. Chest discomfort–differential diagnosis includes chest wall discomfort versus myocardial ischemia.   I can reproduce the discomfort by palpating his right lower parasternal rib celio 03/13/2018   CA 8.2 03/13/2018   INR 2.3 03/13/2018     Echocardiogram– ejection fraction 40% with severe hypokinesis.     Danny Kan MD  Medical Director, Critical Care, [address] [place] [address]  Medical Director, [facility]  Pager: [phone]

## 2018-03-13 NOTE — PROGRESS NOTES
[facility]    Progress Note    Sanchez Rising Patient Status:  Observation    1929 MRN [id number]   Location [facility] 3W/SW Attending Boby Ko MD   Hosp Day # 1 PCP Helene Chery MD       Subjective:     Pt with con 3/12/2018  ECG Report  Interpretation  -------------------------- Atrial flutter-fibrillation -Right bundle branch block and right axis -possible right ventricular hypertrophy -consider pulmonary disease or posterior fasicular block.  -Old inferolateral in

## 2018-03-13 NOTE — CONSULTS
[facility]    Consult Note    Date:  3/12/2018  Date of Admission:  3/12/2018      Chief Complaint:   Lauren Zimmerman is a(n) 80year old male with chest discomfort.     HPI:   Patient has history of end-stage COPD having smoked 2 packs per Prior to Admission:  Calcium Carbonate-Vitamin D (CALCIUM-D) 600-400 MG-UNIT Oral Tab Take 1 tablet by mouth See Admin Instructions. MOWEFR   Warfarin Sodium 2.5 MG Oral Tab Take 2.5 mg by mouth 3 (three) times a week.    Tiotropium Bromide Monohydrate 18 M nor edema. Neurologic grossly intact with symmetric tone and strength and reflex. Chest wall mild tenderness at palpation at the right lower parasternal region which reproduces the chest discomfort.       Results:     Lab Results  Component Value Date

## 2018-03-14 ENCOUNTER — PRIOR ORIGINAL RECORDS (OUTPATIENT)
Dept: OTHER | Age: 83
End: 2018-03-14

## 2018-03-14 NOTE — PLAN OF CARE
PAIN - ADULT    • Verbalizes/displays adequate comfort level or patient's stated pain goal Adequate for Discharge    Pain controlled     Patient/Family Goals    • Patient/Family Long Term Goal Adequate for Discharge    • Patient/Family Short Term Goal Adeq

## 2018-03-14 NOTE — PROGRESS NOTES
[facility] - [facility]    Progress Note      Assessment and Plan:   1. Chest discomfort–differential diagnosis includes chest wall discomfort versus myocardial ischemia.   I can reproduce the discomfort by palpating his right lower parasternal rib celio BUN 30 03/14/2018    03/14/2018   K 4.6 03/14/2018    03/14/2018   CO2 28 03/14/2018    03/14/2018   CA 8.3 03/14/2018   INR 2.3 03/14/2018     Echocardiogram– ejection fraction 40% with severe hypokinesis.     Jaspreet Esparza MD  Med

## 2018-03-14 NOTE — PROGRESS NOTES
[place] [facility] - [facility]  Progress Note     Davina Salinas  : 1929    Status: Observation  Day #: 1    Attending: Ebony Dubois MD  PCP: Myrna Knight MD      Assessment and Plan     NSTEMI (non-ST elevated myocardial infarction) ([facility] [address]  - Pt with vivienne 790 ml         Recent Labs   Lab  03/12/18   0824  03/13/18   0544  03/14/18   0624   WBC  9.2  8.4  8.3   HGB  12.6*  11.3*  11.2*   HCT  38.8*  34.0*  33.4*   PLT  216  195  196   RBC  4.47*  3.93*  3.88*   MCV  86.9  86.3  86.1   MCH  28.2  28.9  28.7

## 2018-03-14 NOTE — CARDIAC REHAB
Cardiac Rehab Phase I    Activity:  Pt eating breakfast in bed. Denies any c/o's of discomfort. States that he has been walking in room with assistance. He only uses a walker at home when leaving the house.  No walker or cane indoors, wears oxygen contiguo

## 2018-03-14 NOTE — PROGRESS NOTES
[facility] - [facility]  Cardiology Progress Note    Jaime Judge Patient Status:  Observation    1929 MRN [id number]   Location [facility] 3W/SW Attending Edy Morin MD   Hosp Day # 1 PCP Makenzie Mckeon MD       Assessment and Plan:   1 kg)      Tele:afib    Physical Exam:    General: Alert and oriented x 3. Breathing comfortably NC O2  HEENT: Normocephalic,   Neck: No JVD,  Cardiac: Irregular rate and rhythm.  S1, S2 normal.  Lungs: decreased BS bilateral  Abdomen: Soft,Extremities: Witho 100 mg Oral BID   PEG 3350 (MIRALAX) powder packet 17 g 17 g Oral Daily PRN   magnesium hydroxide (MILK OF MAGNESIA) 400 MG/5ML suspension 30 mL 30 mL Oral Daily PRN   bisacodyl (DULCOLAX) rectal suppository 10 mg 10 mg Rectal Daily PRN   FLEET ENEMA (FLEE

## 2018-03-14 NOTE — DISCHARGE SUMMARY
[place] [facility] - [facility]  Discharge Summary     Angelyn Schwab  : 1929    Status: Observation  Day #: 1    Attending: Ni Flores MD  PCP: Angeli Aiken MD     Date of Admission: 3/12/2018  Date of Discharge: 3/14/2018     Hospital Discharge Diagno is also been started on Protonix in case there is a gastrointestinal component. Is currently stable for discharge home. He will continue his Coumadin.        Consultants     Provider Role Specialty    Georganne Meckel, MD Consulting Physician  PULMONARY D mouth 3 (three) times a week. Refills:  0     Warfarin Sodium 5 MG Tabs  Commonly known as:  COUMADIN  What changed:  when to take this      Take 1 tablet (5 mg total) by mouth nightly.    Quantity:  10 tablet  Refills:  0        CONTINUE taking these med [address] [place] [address] Reynaldo Bravo MD In 2 weeks.     Specialties:  PULMONARY DISEASES, Internal Medicine, Critical Care  Contact information:  [address] [phone] [address]

## 2018-03-14 NOTE — TRANSITION NOTE
HPI  This is an 70-year-old gentleman with history of cardiomyopathy, elevated troponins, COPD on home O2. Patient was seen earlier today in the emergency room. He was resting comfortably in no distress. Daughter was at bedside.   When I asked patient START taking these medications      Instructions Prescription details   Pantoprazole Sodium 40 MG Tbec  Commonly known as:  PROTONIX      Take 1 tablet (40 mg total) by mouth every morning before breakfast.   Quantity:  30 tablet  Refills:  0        ALLEN drug:  Ezetimibe-Simvastatin      Take 1 tablet by mouth nightly.    Refills:  0           Where to Get Your Medications      These medications were sent to [address] , [address]  [phone], [address] [place] [address]

## 2018-03-22 LAB
BUN: 30 MG/DL
CALCIUM: 8.3 MG/DL
CHLORIDE: 100 MEQ/L
CHOLESTEROL, TOTAL: 99 MG/DL
CREATININE, SERUM: 1.39 MG/DL
GLUCOSE: 130 MG/DL
HDL CHOLESTEROL: 43 MG/DL
HEMATOCRIT: 33.4 %
HEMOGLOBIN: 11.2 G/DL
LDL CHOLESTEROL: 46 MG/DL
NON-HDL CHOLESTEROL: 56 MG/DL
PLATELETS: 196 K/UL
POTASSIUM, SERUM: 4.6 MEQ/L
RED BLOOD COUNT: 3.88 X 10-6/U
SODIUM: 136 MEQ/L
TRIGLYCERIDES: 48 MG/DL
WHITE BLOOD COUNT: 8.3 X 10-3/U

## 2018-03-23 ENCOUNTER — PRIOR ORIGINAL RECORDS (OUTPATIENT)
Dept: OTHER | Age: 83
End: 2018-03-23

## 2018-03-23 ENCOUNTER — MYAURORA ACCOUNT LINK (OUTPATIENT)
Dept: OTHER | Age: 83
End: 2018-03-23

## 2018-04-06 NOTE — ED PROVIDER NOTES
Patient Seen in: [facility] AND [facility] Emergency Department    History   Patient presents with:  Nose Bleed (nasopharyngeal)    Stated Complaint: nose bleed started on sunday    HPI    25-year-old male presents for evaluation of epistaxis.   Patient reports s pressure   Eyes: Conjunctivae and EOM are normal.   Neck: Normal range of motion. Neck supple. Cardiovascular: Normal rate, regular rhythm, normal heart sounds and intact distal pulses.     Pulmonary/Chest: Effort normal and breath sounds normal. No respi bilateral naris, and reexamination no bleeding noted to the right nare, however left nare continues to use. 7.5 Rhino Rocket placed, hemostasis noted. INR is therapeutic.   Patient wears nasal cannula O2 secondary to COPD, observed in the emergency depart

## 2018-04-09 NOTE — PROGRESS NOTES
Lauren Zimmerman is a 80year old male. Patient presents with:  Epistaxis: ED follow up- 4/4/18- for nose packing removal today    HPI:   He had severe bleeding from the side of his nose about a week ago.   The bleeding was able to be stopped but then starte Ezetimibe-Simvastatin (VYTORIN) 10-20 MG Oral Tab Take 1 tablet by mouth nightly. Disp:  Rfl:    aspirin 81 MG Oral Chew Tab Chew 81 mg by mouth daily. Disp:  Rfl:    multivitamin Oral Tab Take 1 tablet by mouth daily.  Disp:  Rfl:       Past Medical Hist Submandibular. Anterior cervical. Posterior cervical. Supraclavicular.    Eyes Normal Conjunctiva - Right: Normal, Left: Normal. Pupil - Right: Normal, Left: Normal.    Ears Normal Inspection - Right: Normal, Left: Normal. Canal - Left: Normal. TM - Right:

## 2018-04-25 ENCOUNTER — PRIOR ORIGINAL RECORDS (OUTPATIENT)
Dept: OTHER | Age: 83
End: 2018-04-25

## 2018-05-02 NOTE — ED PROVIDER NOTES
Patient Seen in: [facility] AND [facility] Emergency Department    History   Patient presents with:  Nose Bleed (nasopharyngeal)      HPI    Patient presents to the ED complaining of intermittent epistaxis from his left nostril.   He is had intermittent symptoms pertinent positives to the presenting problem noted.     Physical Exam     ED Triage Vitals [05/02/18 0106]  BP: 106/69  Pulse: 85  Resp: 16  Temp: 97.8 °F (36.6 °C)  Temp src: n/a  SpO2: 98 %  O2 Device: Nasal cannula    Physical Exam   Constitutional: He evaluation. ED Course: Patient presents with ongoing mild epistaxis from his left nostril. This was resolved using initial compression followed by tranexamic acid application. Stable for discharge with ENT and PMD follow-up.     Procedure:  Epistaxis M

## 2018-05-02 NOTE — ED NOTES
Epistaxis has been controlled. The patient is cleared for discharge per Emergency Department provider. Discharge instructions reviewed with patient including when and how to follow up with healthcare providers and when to seek emergency care.  The patient

## 2018-05-02 NOTE — ED NOTES
Patient who reports being on Coumadin for atrial fibrillation returns to us today with another uncontrolled nose bleed since 2130 pm yesterday. Nose clamp is in place. Oxygen is being delivered via cannula through through the mouth at 3 L per minute.

## 2018-05-16 NOTE — PROGRESS NOTES
Saad Frey is a 80year old male. Patient presents with:  Epistaxis: pt reports recurring nose bleeds of left nostril 2-3 week,  no longer taking coumadin    HPI:   He is continued to experience multiple episodes of epistaxis.   He stopped taking his C Medical History:   Diagnosis Date   • Aortic aneurysm (HCC)     Repaired   • Arrhythmia     atrial fibrillation   • Asthma    • Congestive heart disease (HCC)    • COPD (chronic obstructive pulmonary disease) ([facility] [address]     uses home oxygen   • Heart attack (HC Normal. Pupil - Right: Normal, Left: Normal.    Ears Normal Inspection - Right: Normal, Left: Normal. Canal - Left: Normal. TM - Right: Normal, Left: Normal.     Procedure:  After informed consent obtained, topical anesthesia consisting of lidocaine with e

## 2018-06-05 NOTE — PROGRESS NOTES
Ailyn Prieto is a 80year old male. Patient presents with:  Epistaxis: Pt presents with nose bleeds. Per daughter 6 episodes this week. last 20 mins to couple hrs. clots, currently taking blood thinners.      HPI:   He started having bleeding again from 0.00      Years: 0.00         Quit date: 1/1/1986  Smokeless tobacco: Never Used                      Alcohol use:  No                 REVIEW OF SYSTEMS:   GENERAL HEALTH: feels well otherwise  GENERAL : denies fever, chills, sweats, weight loss, weight gai recurrent episodes of epistaxis. Cauterized his nose again today with good results. Precautions discussed. Return for any problems.  - CTRL NOSEBLEED,ANTER,SIMPLE      The patient indicates understanding of these issues and agrees to the plan.       Umu Spencer

## 2018-06-27 ENCOUNTER — PRIOR ORIGINAL RECORDS (OUTPATIENT)
Dept: OTHER | Age: 83
End: 2018-06-27

## 2018-06-27 ENCOUNTER — MYAURORA ACCOUNT LINK (OUTPATIENT)
Dept: OTHER | Age: 83
End: 2018-06-27

## 2019-01-01 ENCOUNTER — APPOINTMENT (OUTPATIENT)
Dept: GENERAL RADIOLOGY | Facility: HOSPITAL | Age: 84
DRG: 417 | End: 2019-01-01
Attending: HOSPITALIST
Payer: MEDICARE

## 2019-01-01 ENCOUNTER — ANESTHESIA EVENT (OUTPATIENT)
Dept: SURGERY | Facility: HOSPITAL | Age: 84
DRG: 417 | End: 2019-01-01
Payer: MEDICARE

## 2019-01-01 ENCOUNTER — HOSPITAL ENCOUNTER (INPATIENT)
Facility: HOSPITAL | Age: 84
LOS: 3 days | DRG: 417 | End: 2019-01-01
Attending: EMERGENCY MEDICINE | Admitting: HOSPITALIST
Payer: MEDICARE

## 2019-01-01 ENCOUNTER — APPOINTMENT (OUTPATIENT)
Dept: CV DIAGNOSTICS | Facility: HOSPITAL | Age: 84
DRG: 417 | End: 2019-01-01
Attending: HOSPITALIST
Payer: MEDICARE

## 2019-01-01 ENCOUNTER — APPOINTMENT (OUTPATIENT)
Dept: CT IMAGING | Facility: HOSPITAL | Age: 84
DRG: 417 | End: 2019-01-01
Attending: EMERGENCY MEDICINE
Payer: MEDICARE

## 2019-01-01 ENCOUNTER — ANESTHESIA (OUTPATIENT)
Dept: SURGERY | Facility: HOSPITAL | Age: 84
DRG: 417 | End: 2019-01-01
Payer: MEDICARE

## 2019-01-01 VITALS
HEART RATE: 82 BPM | SYSTOLIC BLOOD PRESSURE: 73 MMHG | TEMPERATURE: 97 F | DIASTOLIC BLOOD PRESSURE: 49 MMHG | RESPIRATION RATE: 18 BRPM | HEIGHT: 71 IN | OXYGEN SATURATION: 94 % | WEIGHT: 179 LBS | BODY MASS INDEX: 25.06 KG/M2

## 2019-01-01 DIAGNOSIS — K81.0 ACUTE CHOLECYSTITIS: Primary | ICD-10-CM

## 2019-01-01 LAB
ALBUMIN SERPL BCP-MCNC: 2.9 G/DL (ref 3.5–4.8)
ALBUMIN SERPL BCP-MCNC: 3 G/DL (ref 3.5–4.8)
ALBUMIN SERPL BCP-MCNC: 3.2 G/DL (ref 3.5–4.8)
ALBUMIN SERPL BCP-MCNC: 3.3 G/DL (ref 3.5–4.8)
ALBUMIN SERPL BCP-MCNC: 3.4 G/DL (ref 3.5–4.8)
ALBUMIN/GLOB SERPL: 0.8 {RATIO} (ref 1–2)
ALBUMIN/GLOB SERPL: 0.8 {RATIO} (ref 1–2)
ALBUMIN/GLOB SERPL: 0.9 {RATIO} (ref 1–2)
ALBUMIN/GLOB SERPL: 0.9 {RATIO} (ref 1–2)
ALP SERPL-CCNC: 72 U/L (ref 32–100)
ALP SERPL-CCNC: 89 U/L (ref 32–100)
ALP SERPL-CCNC: 92 U/L (ref 32–100)
ALP SERPL-CCNC: 93 U/L (ref 32–100)
ALP SERPL-CCNC: 95 U/L (ref 32–100)
ALT SERPL-CCNC: 100 U/L (ref 17–63)
ALT SERPL-CCNC: 26 U/L (ref 17–63)
ALT SERPL-CCNC: 26 U/L (ref 17–63)
ALT SERPL-CCNC: 262 U/L (ref 17–63)
ALT SERPL-CCNC: 898 U/L (ref 17–63)
ANION GAP SERPL CALC-SCNC: 10 MMOL/L (ref 0–18)
ANION GAP SERPL CALC-SCNC: 11 MMOL/L (ref 0–18)
ANION GAP SERPL CALC-SCNC: 13 MMOL/L (ref 0–18)
ANION GAP SERPL CALC-SCNC: 16 MMOL/L (ref 0–18)
ANION GAP SERPL CALC-SCNC: 20 MMOL/L (ref 0–18)
ANTIBODY SCREEN: NEGATIVE
ANTIBODY SCREEN: NEGATIVE
AST SERPL-CCNC: 1403 U/L (ref 15–41)
AST SERPL-CCNC: 161 U/L (ref 15–41)
AST SERPL-CCNC: 338 U/L (ref 15–41)
AST SERPL-CCNC: 38 U/L (ref 15–41)
AST SERPL-CCNC: 42 U/L (ref 15–41)
BASE EXCESS BLD CALC-SCNC: -7.7 MMOL/L (ref ?–2)
BASOPHILS # BLD: 0 K/UL (ref 0–0.2)
BASOPHILS # BLD: 0 K/UL (ref 0–0.2)
BASOPHILS # BLD: 0.1 K/UL (ref 0–0.2)
BASOPHILS NFR BLD: 0 %
BASOPHILS NFR BLD: 1 %
BASOPHILS NFR BLD: 2 %
BILIRUB DIRECT SERPL-MCNC: 0.5 MG/DL (ref 0–0.2)
BILIRUB SERPL-MCNC: 1.4 MG/DL (ref 0.3–1.2)
BILIRUB SERPL-MCNC: 1.6 MG/DL (ref 0.3–1.2)
BILIRUB SERPL-MCNC: 1.8 MG/DL (ref 0.3–1.2)
BILIRUB SERPL-MCNC: 1.8 MG/DL (ref 0.3–1.2)
BILIRUB SERPL-MCNC: 2.9 MG/DL (ref 0.3–1.2)
BILIRUB UR QL: NEGATIVE
BLOOD TYPE BARCODE: 7300
BLOOD TYPE BARCODE: 7300
BUN SERPL-MCNC: 19 MG/DL (ref 8–20)
BUN SERPL-MCNC: 22 MG/DL (ref 8–20)
BUN SERPL-MCNC: 24 MG/DL (ref 8–20)
BUN SERPL-MCNC: 33 MG/DL (ref 8–20)
BUN SERPL-MCNC: 48 MG/DL (ref 8–20)
BUN/CREAT SERPL: 11.7 (ref 10–20)
BUN/CREAT SERPL: 12.9 (ref 10–20)
BUN/CREAT SERPL: 13 (ref 10–20)
BUN/CREAT SERPL: 13.4 (ref 10–20)
BUN/CREAT SERPL: 17.1 (ref 10–20)
CALCIUM SERPL-MCNC: 8.3 MG/DL (ref 8.5–10.5)
CALCIUM SERPL-MCNC: 8.8 MG/DL (ref 8.5–10.5)
CALCIUM SERPL-MCNC: 8.8 MG/DL (ref 8.5–10.5)
CALCIUM SERPL-MCNC: 9.1 MG/DL (ref 8.5–10.5)
CALCIUM SERPL-MCNC: 9.1 MG/DL (ref 8.5–10.5)
CHLORIDE SERPL-SCNC: 101 MMOL/L (ref 95–110)
CHLORIDE SERPL-SCNC: 92 MMOL/L (ref 95–110)
CHLORIDE SERPL-SCNC: 94 MMOL/L (ref 95–110)
CHLORIDE SERPL-SCNC: 96 MMOL/L (ref 95–110)
CHLORIDE SERPL-SCNC: 97 MMOL/L (ref 95–110)
CO2 SERPL-SCNC: 23 MMOL/L (ref 22–32)
CO2 SERPL-SCNC: 29 MMOL/L (ref 22–32)
CO2 SERPL-SCNC: 30 MMOL/L (ref 22–32)
CO2 SERPL-SCNC: 31 MMOL/L (ref 22–32)
CO2 SERPL-SCNC: 32 MMOL/L (ref 22–32)
COLOR UR: YELLOW
CREAT SERPL-MCNC: 1.29 MG/DL (ref 0.5–1.5)
CREAT SERPL-MCNC: 1.42 MG/DL (ref 0.5–1.5)
CREAT SERPL-MCNC: 2.06 MG/DL (ref 0.5–1.5)
CREAT SERPL-MCNC: 2.53 MG/DL (ref 0.5–1.5)
CREAT SERPL-MCNC: 3.71 MG/DL (ref 0.5–1.5)
CREAT UR-MCNC: 93.3 MG/DL
EOSINOPHIL # BLD: 0 K/UL (ref 0–0.7)
EOSINOPHIL # BLD: 0 K/UL (ref 0–0.7)
EOSINOPHIL # BLD: 0.1 K/UL (ref 0–0.7)
EOSINOPHIL NFR BLD: 0 %
EOSINOPHIL NFR BLD: 0 %
EOSINOPHIL NFR BLD: 1 %
EOSINOPHIL NFR BLD: 2 %
EOSINOPHIL NFR BLD: 2 %
ERYTHROCYTE [DISTWIDTH] IN BLOOD BY AUTOMATED COUNT: 26.6 % (ref 11–15)
ERYTHROCYTE [DISTWIDTH] IN BLOOD BY AUTOMATED COUNT: 26.6 % (ref 11–15)
ERYTHROCYTE [DISTWIDTH] IN BLOOD BY AUTOMATED COUNT: 26.7 % (ref 11–15)
ERYTHROCYTE [DISTWIDTH] IN BLOOD BY AUTOMATED COUNT: 26.9 % (ref 11–15)
ERYTHROCYTE [DISTWIDTH] IN BLOOD BY AUTOMATED COUNT: 27.2 % (ref 11–15)
GLOBULIN PLAS-MCNC: 3.3 G/DL (ref 2.5–3.7)
GLOBULIN PLAS-MCNC: 3.8 G/DL (ref 2.5–3.7)
GLOBULIN PLAS-MCNC: 3.9 G/DL (ref 2.5–3.7)
GLOBULIN PLAS-MCNC: 4.2 G/DL (ref 2.5–3.7)
GLUCOSE SERPL-MCNC: 109 MG/DL (ref 70–99)
GLUCOSE SERPL-MCNC: 118 MG/DL (ref 70–99)
GLUCOSE SERPL-MCNC: 123 MG/DL (ref 70–99)
GLUCOSE SERPL-MCNC: 137 MG/DL (ref 70–99)
GLUCOSE SERPL-MCNC: 77 MG/DL (ref 70–99)
GLUCOSE UR-MCNC: NEGATIVE MG/DL
HCO3 BLDA-SCNC: 18.2 MEQ/L (ref 21–27)
HCT VFR BLD AUTO: 36.9 % (ref 41–52)
HCT VFR BLD AUTO: 40.6 % (ref 41–52)
HCT VFR BLD AUTO: 40.8 % (ref 41–52)
HCT VFR BLD AUTO: 41 % (ref 41–52)
HCT VFR BLD AUTO: 41.5 % (ref 41–52)
HGB BLD-MCNC: 11.8 G/DL (ref 13.5–17.5)
HGB BLD-MCNC: 12.5 G/DL (ref 13.5–17.5)
HGB BLD-MCNC: 12.8 G/DL (ref 13.5–17.5)
HGB BLD-MCNC: 12.8 G/DL (ref 13.5–17.5)
HGB BLD-MCNC: 13 G/DL (ref 13.5–17.5)
INR BLD: 2 (ref 0.9–1.2)
INR BLD: 2.1 (ref 0.9–1.2)
INR BLD: 2.1 (ref 0.9–1.2)
INR BLD: 3 (ref 0.9–1.2)
INR BLD: 5.6 (ref 0.9–1.2)
KETONES UR-MCNC: NEGATIVE MG/DL
LIPASE SERPL-CCNC: 37 U/L (ref 22–51)
LYMPHOCYTES # BLD: 0.3 K/UL (ref 1–4)
LYMPHOCYTES # BLD: 0.7 K/UL (ref 1–4)
LYMPHOCYTES # BLD: 0.7 K/UL (ref 1–4)
LYMPHOCYTES # BLD: 0.8 K/UL (ref 1–4)
LYMPHOCYTES # BLD: 0.9 K/UL (ref 1–4)
LYMPHOCYTES NFR BLD: 12 %
LYMPHOCYTES NFR BLD: 13 %
LYMPHOCYTES NFR BLD: 14 %
LYMPHOCYTES NFR BLD: 14 %
LYMPHOCYTES NFR BLD: 4 %
MAGNESIUM SERPL-MCNC: 2.8 MG/DL (ref 1.8–2.5)
MCH RBC QN AUTO: 27.1 PG (ref 27–32)
MCH RBC QN AUTO: 27.2 PG (ref 27–32)
MCH RBC QN AUTO: 27.3 PG (ref 27–32)
MCH RBC QN AUTO: 27.3 PG (ref 27–32)
MCH RBC QN AUTO: 27.6 PG (ref 27–32)
MCHC RBC AUTO-ENTMCNC: 30.8 G/DL (ref 32–37)
MCHC RBC AUTO-ENTMCNC: 30.9 G/DL (ref 32–37)
MCHC RBC AUTO-ENTMCNC: 31.2 G/DL (ref 32–37)
MCHC RBC AUTO-ENTMCNC: 31.7 G/DL (ref 32–37)
MCHC RBC AUTO-ENTMCNC: 32 G/DL (ref 32–37)
MCV RBC AUTO: 85.4 FL (ref 80–100)
MCV RBC AUTO: 85.6 FL (ref 80–100)
MCV RBC AUTO: 87.8 FL (ref 80–100)
MCV RBC AUTO: 88.5 FL (ref 80–100)
MCV RBC AUTO: 88.7 FL (ref 80–100)
MONOCYTES # BLD: 0.7 K/UL (ref 0–1)
MONOCYTES # BLD: 0.8 K/UL (ref 0–1)
MONOCYTES # BLD: 1 K/UL (ref 0–1)
MONOCYTES # BLD: 1 K/UL (ref 0–1)
MONOCYTES # BLD: 1.2 K/UL (ref 0–1)
MONOCYTES NFR BLD: 12 %
MONOCYTES NFR BLD: 13 %
MONOCYTES NFR BLD: 15 %
MONOCYTES NFR BLD: 16 %
MONOCYTES NFR BLD: 16 %
NEUTROPHILS # BLD AUTO: 3.4 K/UL (ref 1.8–7.7)
NEUTROPHILS # BLD AUTO: 4.4 K/UL (ref 1.8–7.7)
NEUTROPHILS # BLD AUTO: 4.5 K/UL (ref 1.8–7.7)
NEUTROPHILS # BLD AUTO: 4.5 K/UL (ref 1.8–7.7)
NEUTROPHILS # BLD AUTO: 7.3 K/UL (ref 1.8–7.7)
NEUTROPHILS NFR BLD: 68 %
NEUTROPHILS NFR BLD: 69 %
NEUTROPHILS NFR BLD: 72 %
NEUTROPHILS NFR BLD: 73 %
NEUTROPHILS NFR BLD: 83 %
NITRITE UR QL STRIP.AUTO: NEGATIVE
O2 CT BLD-SCNC: 13.2 VOL% (ref 15–23)
OSMOLALITY UR CALC.SUM OF ELEC: 291 MOSM/KG (ref 275–295)
OSMOLALITY UR CALC.SUM OF ELEC: 294 MOSM/KG (ref 275–295)
OSMOLALITY UR CALC.SUM OF ELEC: 294 MOSM/KG (ref 275–295)
OSMOLALITY UR CALC.SUM OF ELEC: 296 MOSM/KG (ref 275–295)
OSMOLALITY UR CALC.SUM OF ELEC: 298 MOSM/KG (ref 275–295)
OXYGEN LITERS/MINUTE: 15 L/MIN
PATIENT FASTING: YES
PATIENT FASTING: YES
PCO2 BLDA: 68 MM HG (ref 35–45)
PH BLDA: 7.13 [PH] (ref 7.35–7.45)
PH UR: 5 [PH] (ref 5–8)
PHOSPHATE SERPL-MCNC: 8 MG/DL (ref 2.4–4.7)
PLATELET # BLD AUTO: 107 K/UL (ref 140–400)
PLATELET # BLD AUTO: 90 K/UL (ref 140–400)
PLATELET # BLD AUTO: 92 K/UL (ref 140–400)
PLATELET # BLD AUTO: 93 K/UL (ref 140–400)
PLATELET # BLD AUTO: 99 K/UL (ref 140–400)
PMV BLD AUTO: 7.9 FL (ref 7.4–10.3)
PMV BLD AUTO: 8.2 FL (ref 7.4–10.3)
PMV BLD AUTO: 8.7 FL (ref 7.4–10.3)
PMV BLD AUTO: 8.8 FL (ref 7.4–10.3)
PMV BLD AUTO: 9.2 FL (ref 7.4–10.3)
PO2 BLDA: 53 MM HG (ref 80–100)
POTASSIUM SERPL-SCNC: 3.8 MMOL/L (ref 3.3–5.1)
POTASSIUM SERPL-SCNC: 4 MMOL/L (ref 3.3–5.1)
POTASSIUM SERPL-SCNC: 4.9 MMOL/L (ref 3.3–5.1)
POTASSIUM SERPL-SCNC: 5.2 MMOL/L (ref 3.3–5.1)
POTASSIUM SERPL-SCNC: 5.6 MMOL/L (ref 3.3–5.1)
PROT SERPL-MCNC: 6.2 G/DL (ref 5.9–8.4)
PROT SERPL-MCNC: 6.9 G/DL (ref 5.9–8.4)
PROT SERPL-MCNC: 7.1 G/DL (ref 5.9–8.4)
PROT SERPL-MCNC: 7.4 G/DL (ref 5.9–8.4)
PROT SERPL-MCNC: 7.5 G/DL (ref 5.9–8.4)
PROT UR-MCNC: NEGATIVE MG/DL
PROTHROMBIN TIME: 21.7 SECONDS (ref 11.8–14.5)
PROTHROMBIN TIME: 22.8 SECONDS (ref 11.8–14.5)
PROTHROMBIN TIME: 22.9 SECONDS (ref 11.8–14.5)
PROTHROMBIN TIME: 30.6 SECONDS (ref 11.8–14.5)
PROTHROMBIN TIME: 49.2 SECONDS (ref 11.8–14.5)
PUNCTURE CHARGE: YES
RBC # BLD AUTO: 4.32 M/UL (ref 4.5–5.9)
RBC # BLD AUTO: 4.63 M/UL (ref 4.5–5.9)
RBC # BLD AUTO: 4.64 M/UL (ref 4.5–5.9)
RBC # BLD AUTO: 4.68 M/UL (ref 4.5–5.9)
RBC # BLD AUTO: 4.77 M/UL (ref 4.5–5.9)
RBC #/AREA URNS AUTO: 44 /HPF
RH BLOOD TYPE: POSITIVE
RH BLOOD TYPE: POSITIVE
SAO2 % BLDA: 72.2 % (ref 94–100)
SODIUM SERPL-SCNC: 135 MMOL/L (ref 136–144)
SODIUM SERPL-SCNC: 139 MMOL/L (ref 136–144)
SODIUM SERPL-SCNC: 139 MMOL/L (ref 136–144)
SODIUM SERPL-SCNC: 140 MMOL/L (ref 136–144)
SODIUM SERPL-SCNC: 142 MMOL/L (ref 136–144)
SODIUM UR-SCNC: 11 MMOL/L
SP GR UR STRIP: 1.03 (ref 1–1.03)
UROBILINOGEN UR STRIP-ACNC: <2
VIT C UR-MCNC: NEGATIVE MG/DL
WBC # BLD AUTO: 5 K/UL (ref 4–11)
WBC # BLD AUTO: 6 K/UL (ref 4–11)
WBC # BLD AUTO: 6.3 K/UL (ref 4–11)
WBC # BLD AUTO: 6.6 K/UL (ref 4–11)
WBC # BLD AUTO: 8.8 K/UL (ref 4–11)
WBC #/AREA URNS AUTO: 12 /HPF

## 2019-01-01 PROCEDURE — 99233 SBSQ HOSP IP/OBS HIGH 50: CPT | Performed by: INTERNAL MEDICINE

## 2019-01-01 PROCEDURE — 99222 1ST HOSP IP/OBS MODERATE 55: CPT | Performed by: INTERNAL MEDICINE

## 2019-01-01 PROCEDURE — 99223 1ST HOSP IP/OBS HIGH 75: CPT | Performed by: INTERNAL MEDICINE

## 2019-01-01 PROCEDURE — 99233 SBSQ HOSP IP/OBS HIGH 50: CPT | Performed by: HOSPITALIST

## 2019-01-01 PROCEDURE — 99232 SBSQ HOSP IP/OBS MODERATE 35: CPT | Performed by: INTERNAL MEDICINE

## 2019-01-01 PROCEDURE — 30233R1 TRANSFUSION OF NONAUTOLOGOUS PLATELETS INTO PERIPHERAL VEIN, PERCUTANEOUS APPROACH: ICD-10-PCS | Performed by: HOSPITALIST

## 2019-01-01 PROCEDURE — 71045 X-RAY EXAM CHEST 1 VIEW: CPT | Performed by: HOSPITALIST

## 2019-01-01 PROCEDURE — 99223 1ST HOSP IP/OBS HIGH 75: CPT | Performed by: HOSPITALIST

## 2019-01-01 PROCEDURE — 0FT44ZZ RESECTION OF GALLBLADDER, PERCUTANEOUS ENDOSCOPIC APPROACH: ICD-10-PCS | Performed by: SURGERY

## 2019-01-01 PROCEDURE — 99239 HOSP IP/OBS DSCHRG MGMT >30: CPT | Performed by: HOSPITALIST

## 2019-01-01 PROCEDURE — 74177 CT ABD & PELVIS W/CONTRAST: CPT | Performed by: EMERGENCY MEDICINE

## 2019-01-01 RX ORDER — METOCLOPRAMIDE HYDROCHLORIDE 5 MG/ML
INJECTION INTRAMUSCULAR; INTRAVENOUS
Status: COMPLETED
Start: 2019-01-01 | End: 2019-01-01

## 2019-01-01 RX ORDER — NALOXONE HYDROCHLORIDE 0.4 MG/ML
80 INJECTION, SOLUTION INTRAMUSCULAR; INTRAVENOUS; SUBCUTANEOUS AS NEEDED
Status: DISCONTINUED | OUTPATIENT
Start: 2019-01-01 | End: 2019-01-01 | Stop reason: HOSPADM

## 2019-01-01 RX ORDER — SODIUM CHLORIDE 0.9 % (FLUSH) 0.9 %
3 SYRINGE (ML) INJECTION AS NEEDED
Status: DISCONTINUED | OUTPATIENT
Start: 2019-01-01 | End: 2019-01-01

## 2019-01-01 RX ORDER — MORPHINE SULFATE 10 MG/ML
6 INJECTION, SOLUTION INTRAMUSCULAR; INTRAVENOUS EVERY 10 MIN PRN
Status: DISCONTINUED | OUTPATIENT
Start: 2019-01-01 | End: 2019-01-01

## 2019-01-01 RX ORDER — SODIUM CHLORIDE 0.9 % (FLUSH) 0.9 %
10 SYRINGE (ML) INJECTION AS NEEDED
Status: DISCONTINUED | OUTPATIENT
Start: 2019-01-01 | End: 2019-01-01

## 2019-01-01 RX ORDER — SODIUM CHLORIDE, SODIUM LACTATE, POTASSIUM CHLORIDE, CALCIUM CHLORIDE 600; 310; 30; 20 MG/100ML; MG/100ML; MG/100ML; MG/100ML
INJECTION, SOLUTION INTRAVENOUS CONTINUOUS
Status: DISCONTINUED | OUTPATIENT
Start: 2019-01-01 | End: 2019-01-01 | Stop reason: HOSPADM

## 2019-01-01 RX ORDER — SODIUM CHLORIDE 9 MG/ML
INJECTION, SOLUTION INTRAVENOUS ONCE
Status: COMPLETED | OUTPATIENT
Start: 2019-01-01 | End: 2019-01-01

## 2019-01-01 RX ORDER — ONDANSETRON 2 MG/ML
4 INJECTION INTRAMUSCULAR; INTRAVENOUS EVERY 6 HOURS PRN
Status: DISCONTINUED | OUTPATIENT
Start: 2019-01-01 | End: 2019-01-01

## 2019-01-01 RX ORDER — ONDANSETRON 2 MG/ML
4 INJECTION INTRAMUSCULAR; INTRAVENOUS ONCE AS NEEDED
Status: DISCONTINUED | OUTPATIENT
Start: 2019-01-01 | End: 2019-01-01 | Stop reason: HOSPADM

## 2019-01-01 RX ORDER — PHYTONADIONE 1 MG/.5ML
1 INJECTION, EMULSION INTRAMUSCULAR; INTRAVENOUS; SUBCUTANEOUS ONCE
Status: DISCONTINUED | OUTPATIENT
Start: 2019-01-01 | End: 2019-01-01

## 2019-01-01 RX ORDER — SODIUM CHLORIDE 9 MG/ML
INJECTION, SOLUTION INTRAVENOUS CONTINUOUS
Status: ACTIVE | OUTPATIENT
Start: 2019-01-01 | End: 2019-01-01

## 2019-01-01 RX ORDER — SODIUM CHLORIDE, SODIUM LACTATE, POTASSIUM CHLORIDE, CALCIUM CHLORIDE 600; 310; 30; 20 MG/100ML; MG/100ML; MG/100ML; MG/100ML
INJECTION, SOLUTION INTRAVENOUS
Status: DISPENSED
Start: 2019-01-01 | End: 2019-01-01

## 2019-01-01 RX ORDER — MORPHINE SULFATE 2 MG/ML
2 INJECTION, SOLUTION INTRAMUSCULAR; INTRAVENOUS EVERY 2 HOUR PRN
Status: DISCONTINUED | OUTPATIENT
Start: 2019-01-01 | End: 2019-01-01

## 2019-01-01 RX ORDER — SODIUM CHLORIDE 9 MG/ML
INJECTION, SOLUTION INTRAVENOUS
Status: COMPLETED
Start: 2019-01-01 | End: 2019-01-01

## 2019-01-01 RX ORDER — MORPHINE SULFATE 4 MG/ML
2 INJECTION, SOLUTION INTRAMUSCULAR; INTRAVENOUS EVERY 10 MIN PRN
Status: DISCONTINUED | OUTPATIENT
Start: 2019-01-01 | End: 2019-01-01

## 2019-01-01 RX ORDER — SODIUM CHLORIDE, SODIUM LACTATE, POTASSIUM CHLORIDE, CALCIUM CHLORIDE 600; 310; 30; 20 MG/100ML; MG/100ML; MG/100ML; MG/100ML
INJECTION, SOLUTION INTRAVENOUS CONTINUOUS
Status: DISCONTINUED | OUTPATIENT
Start: 2019-01-01 | End: 2019-01-01

## 2019-01-01 RX ORDER — POLYETHYLENE GLYCOL 3350 17 G/17G
17 POWDER, FOR SOLUTION ORAL DAILY PRN
Status: DISCONTINUED | OUTPATIENT
Start: 2019-01-01 | End: 2019-01-01

## 2019-01-01 RX ORDER — HYDROCODONE BITARTRATE AND ACETAMINOPHEN 5; 325 MG/1; MG/1
2 TABLET ORAL AS NEEDED
Status: DISCONTINUED | OUTPATIENT
Start: 2019-01-01 | End: 2019-01-01 | Stop reason: HOSPADM

## 2019-01-01 RX ORDER — TORSEMIDE 20 MG/1
20 TABLET ORAL
Status: DISCONTINUED | OUTPATIENT
Start: 2019-01-01 | End: 2019-01-01

## 2019-01-01 RX ORDER — ACETAMINOPHEN 500 MG
1000 TABLET ORAL ONCE
Status: COMPLETED | OUTPATIENT
Start: 2019-01-01 | End: 2019-01-01

## 2019-01-01 RX ORDER — IPRATROPIUM BROMIDE AND ALBUTEROL SULFATE 2.5; .5 MG/3ML; MG/3ML
3 SOLUTION RESPIRATORY (INHALATION)
Status: DISCONTINUED | OUTPATIENT
Start: 2019-01-01 | End: 2019-01-01

## 2019-01-01 RX ORDER — SODIUM CHLORIDE 9 MG/ML
INJECTION, SOLUTION INTRAVENOUS CONTINUOUS
Status: DISCONTINUED | OUTPATIENT
Start: 2019-01-01 | End: 2019-01-01

## 2019-01-01 RX ORDER — METOCLOPRAMIDE HYDROCHLORIDE 5 MG/ML
10 INJECTION INTRAMUSCULAR; INTRAVENOUS EVERY 6 HOURS PRN
Status: DISCONTINUED | OUTPATIENT
Start: 2019-01-01 | End: 2019-01-01

## 2019-01-01 RX ORDER — SODIUM CHLORIDE 9 MG/ML
INJECTION, SOLUTION INTRAVENOUS ONCE
Status: DISCONTINUED | OUTPATIENT
Start: 2019-01-01 | End: 2019-01-01

## 2019-01-01 RX ORDER — DEXTROSE, SODIUM CHLORIDE, AND POTASSIUM CHLORIDE 5; .9; .15 G/100ML; G/100ML; G/100ML
INJECTION INTRAVENOUS CONTINUOUS
Status: DISCONTINUED | OUTPATIENT
Start: 2019-01-01 | End: 2019-01-01

## 2019-01-01 RX ORDER — BUPIVACAINE HYDROCHLORIDE 2.5 MG/ML
INJECTION, SOLUTION EPIDURAL; INFILTRATION; INTRACAUDAL AS NEEDED
Status: DISCONTINUED | OUTPATIENT
Start: 2019-01-01 | End: 2019-01-01 | Stop reason: HOSPADM

## 2019-01-01 RX ORDER — DOCUSATE SODIUM 100 MG/1
100 CAPSULE, LIQUID FILLED ORAL 2 TIMES DAILY
Status: DISCONTINUED | OUTPATIENT
Start: 2019-01-01 | End: 2019-01-01

## 2019-01-01 RX ORDER — MORPHINE SULFATE 4 MG/ML
4 INJECTION, SOLUTION INTRAMUSCULAR; INTRAVENOUS EVERY 10 MIN PRN
Status: DISCONTINUED | OUTPATIENT
Start: 2019-01-01 | End: 2019-01-01

## 2019-01-01 RX ORDER — ACETAMINOPHEN 325 MG/1
650 TABLET ORAL EVERY 6 HOURS PRN
Status: DISCONTINUED | OUTPATIENT
Start: 2019-01-01 | End: 2019-01-01

## 2019-01-01 RX ORDER — DOXEPIN HYDROCHLORIDE 50 MG/1
1 CAPSULE ORAL DAILY
Status: DISCONTINUED | OUTPATIENT
Start: 2019-01-01 | End: 2019-01-01

## 2019-01-01 RX ORDER — ROCURONIUM BROMIDE 10 MG/ML
INJECTION, SOLUTION INTRAVENOUS AS NEEDED
Status: DISCONTINUED | OUTPATIENT
Start: 2019-01-01 | End: 2019-01-01 | Stop reason: SURG

## 2019-01-01 RX ORDER — METOCLOPRAMIDE 10 MG/1
10 TABLET ORAL ONCE
Status: COMPLETED | OUTPATIENT
Start: 2019-01-01 | End: 2019-01-01

## 2019-01-01 RX ORDER — HYDROCODONE BITARTRATE AND ACETAMINOPHEN 5; 325 MG/1; MG/1
1 TABLET ORAL AS NEEDED
Status: DISCONTINUED | OUTPATIENT
Start: 2019-01-01 | End: 2019-01-01 | Stop reason: HOSPADM

## 2019-01-01 RX ORDER — TRAMADOL HYDROCHLORIDE 50 MG/1
50 TABLET ORAL EVERY 12 HOURS PRN
Status: DISCONTINUED | OUTPATIENT
Start: 2019-01-01 | End: 2019-01-01

## 2019-01-01 RX ORDER — METOPROLOL SUCCINATE 25 MG/1
25 TABLET, EXTENDED RELEASE ORAL
Status: DISCONTINUED | OUTPATIENT
Start: 2019-01-01 | End: 2019-01-01

## 2019-01-01 RX ORDER — GLYCOPYRROLATE 0.2 MG/ML
INJECTION INTRAMUSCULAR; INTRAVENOUS AS NEEDED
Status: DISCONTINUED | OUTPATIENT
Start: 2019-01-01 | End: 2019-01-01 | Stop reason: SURG

## 2019-01-01 RX ORDER — EZETIMIBE AND SIMVASTATIN 10; 20 MG/1; MG/1
1 TABLET ORAL NIGHTLY
Status: DISCONTINUED | OUTPATIENT
Start: 2019-01-01 | End: 2019-01-01

## 2019-01-01 RX ORDER — MORPHINE SULFATE 2 MG/ML
1 INJECTION, SOLUTION INTRAMUSCULAR; INTRAVENOUS EVERY 2 HOUR PRN
Status: DISCONTINUED | OUTPATIENT
Start: 2019-01-01 | End: 2019-01-01

## 2019-01-01 RX ORDER — MORPHINE SULFATE 4 MG/ML
4 INJECTION, SOLUTION INTRAMUSCULAR; INTRAVENOUS EVERY 2 HOUR PRN
Status: DISCONTINUED | OUTPATIENT
Start: 2019-01-01 | End: 2019-01-01

## 2019-01-01 RX ORDER — WARFARIN SODIUM 5 MG/1
2.5 TABLET ORAL
Status: DISCONTINUED | OUTPATIENT
Start: 2019-01-01 | End: 2019-01-01

## 2019-01-01 RX ORDER — LIDOCAINE HYDROCHLORIDE 10 MG/ML
INJECTION, SOLUTION EPIDURAL; INFILTRATION; INTRACAUDAL; PERINEURAL AS NEEDED
Status: DISCONTINUED | OUTPATIENT
Start: 2019-01-01 | End: 2019-01-01 | Stop reason: SURG

## 2019-01-01 RX ORDER — ONDANSETRON 2 MG/ML
INJECTION INTRAMUSCULAR; INTRAVENOUS AS NEEDED
Status: DISCONTINUED | OUTPATIENT
Start: 2019-01-01 | End: 2019-01-01 | Stop reason: SURG

## 2019-01-01 RX ORDER — WARFARIN SODIUM 5 MG/1
2.5 TABLET ORAL NIGHTLY
Status: DISCONTINUED | OUTPATIENT
Start: 2019-01-01 | End: 2019-01-01

## 2019-01-01 RX ORDER — NEOSTIGMINE METHYLSULFATE 0.5 MG/ML
INJECTION INTRAVENOUS AS NEEDED
Status: DISCONTINUED | OUTPATIENT
Start: 2019-01-01 | End: 2019-01-01 | Stop reason: SURG

## 2019-01-01 RX ORDER — METOPROLOL TARTRATE 5 MG/5ML
2.5 INJECTION INTRAVENOUS ONCE
Status: DISCONTINUED | OUTPATIENT
Start: 2019-01-01 | End: 2019-01-01 | Stop reason: HOSPADM

## 2019-01-01 RX ORDER — METOPROLOL SUCCINATE 25 MG/1
12.5 TABLET, EXTENDED RELEASE ORAL
Status: DISCONTINUED | OUTPATIENT
Start: 2019-01-01 | End: 2019-01-01

## 2019-01-01 RX ORDER — SODIUM CHLORIDE 9 MG/ML
INJECTION, SOLUTION INTRAVENOUS
Status: DISPENSED
Start: 2019-01-01 | End: 2019-01-01

## 2019-01-01 RX ORDER — IPRATROPIUM BROMIDE AND ALBUTEROL SULFATE 2.5; .5 MG/3ML; MG/3ML
3 SOLUTION RESPIRATORY (INHALATION) EVERY 6 HOURS PRN
Status: DISCONTINUED | OUTPATIENT
Start: 2019-01-01 | End: 2019-01-01

## 2019-01-01 RX ORDER — TRAMADOL HYDROCHLORIDE 50 MG/1
50 TABLET ORAL EVERY 6 HOURS PRN
Status: DISCONTINUED | OUTPATIENT
Start: 2019-01-01 | End: 2019-01-01

## 2019-01-01 RX ADMIN — GLYCOPYRROLATE 0.4 MG: 0.2 INJECTION INTRAMUSCULAR; INTRAVENOUS at 10:12:00

## 2019-01-01 RX ADMIN — SODIUM CHLORIDE, SODIUM LACTATE, POTASSIUM CHLORIDE, CALCIUM CHLORIDE: 600; 310; 30; 20 INJECTION, SOLUTION INTRAVENOUS at 10:27:00

## 2019-01-01 RX ADMIN — ROCURONIUM BROMIDE 10 MG: 10 INJECTION, SOLUTION INTRAVENOUS at 09:46:00

## 2019-01-01 RX ADMIN — LIDOCAINE HYDROCHLORIDE 10 MG: 10 INJECTION, SOLUTION EPIDURAL; INFILTRATION; INTRACAUDAL; PERINEURAL at 09:35:00

## 2019-01-01 RX ADMIN — GLYCOPYRROLATE 0.2 MG: 0.2 INJECTION INTRAMUSCULAR; INTRAVENOUS at 09:22:00

## 2019-01-01 RX ADMIN — ONDANSETRON 4 MG: 2 INJECTION INTRAMUSCULAR; INTRAVENOUS at 10:12:00

## 2019-01-01 RX ADMIN — NEOSTIGMINE METHYLSULFATE 2 MG: 0.5 INJECTION INTRAVENOUS at 10:12:00

## 2019-01-01 RX ADMIN — SODIUM CHLORIDE, SODIUM LACTATE, POTASSIUM CHLORIDE, CALCIUM CHLORIDE: 600; 310; 30; 20 INJECTION, SOLUTION INTRAVENOUS at 09:31:00

## 2019-01-01 RX ADMIN — GLYCOPYRROLATE 0.2 MG: 0.2 INJECTION INTRAMUSCULAR; INTRAVENOUS at 09:39:00

## 2019-01-11 PROBLEM — K81.0 ACUTE CHOLECYSTITIS: Status: ACTIVE | Noted: 2019-01-01

## 2019-01-11 NOTE — CONSULTS
[facility]    Cardiology Consultation  [facility]    Donal Garcia Patient Status:  Inpatient    1929 MRN [id number]   Location [facility] 4W/SW/SE Attending Whitney Umana MD   Hosp Day # 0 PCP AN Disorder Father    • No Known Problems Mother    • No Known Problems Daughter    • No Known Problems Daughter        Social History  Patient Guardian Status:  Not on file    Other Topics            Concern  Caffeine Concern        Yes    Social History [place] °F (36.3 °C), resp. rate 24, height 5' 11\" (1.803 m), weight 170 lb (77.1 kg), SpO2 100 %.   Intake/Output:   Last 3 shifts: [id number]@   This shift: I/O this shift:  In: 100 [IV PIGGYBACK:100]  Out: -      Vent Settings:      Hemodynamic parameters (l Altagracia Alonzo MD on 1/11/2019 at 14:45     Approved by (CST): Altagracia Alonzo MD on 1/11/2019 at 14:47          Ct Abdomen Pelvis Iv Contrast, No Oral (er)    Result Date: 1/11/2019  CONCLUSION:   Findings suspicious for acute cholecystitis.   Mild di of furosemide, I am going to switch him to torsemide 20 mg twice a day. We will follow with you postoperatively. Thank you for allowing me to participate in the care of your patient.     Ridge Arnett  1/11/2019

## 2019-01-11 NOTE — ED NOTES
Patient arrives via EMS for RLQ abdominal pain that started approximately 0630. Patient on 3L of oxygen via nasal cannula. Wears O2 at home.

## 2019-01-11 NOTE — CONSULTS
[facility]    Report of Consultation    Toan Woody Patient Status:  Inpatient    1929 MRN [id number]   Location [facility] 4W/SW/SE Attending John Curtis MD   Hosp Day # 0 PCP Georgie Doss MD     Date of Admission: PRN   morphINE sulfate (PF) 2 MG/ML injection 1 mg 1 mg Intravenous Q2H PRN   Or      morphINE sulfate (PF) 2 MG/ML injection 2 mg 2 mg Intravenous Q2H PRN   Or      morphINE sulfate (PF) 4 MG/ML injection 4 mg 4 mg Intravenous Q2H PRN   ondansetron HCl (Z Comment:\"mind issues\"    Review of Systems:   Constitutional: denies fevers, chills, weakness, fatigue, recent illness  HEENT: denies headache, sore throat, vision loss  Cardio: denies chest pain, chest pressure, palpitations  Respiratory: dyspnea, denie findings are stable. 4. Cardiomegaly. 5. Atherosclerosis. 6. Demineralization. 7. Scoliosis. 8. Osteoarthritis. Dictated by (CST): Orquidea Greer MD on 1/11/2019 at 14:45     Approved by (CST):  Orquidea Greer MD on 1/11/2019 at 14:47          [facility]  1/11/2019  3:59 PM

## 2019-01-11 NOTE — CONSULTS
[facility] - [facility]    Progress Note    Monster Hicksh Patient Status:  Emergency    1929 MRN [id number]   Location [address] Attending Trudi Davis MD   Hosp Day # 0 PCP Mauricio Hurst MD       Subjective: 01/11/2019    ALKPHO 92 01/11/2019    BILT 1.6 (H) 01/11/2019    TP 7.5 01/11/2019    AST 42 (H) 01/11/2019    ALT 26 01/11/2019    PTT 45.3 (H) 01/02/2018    INR 2.0 (H) 01/11/2019    LIP 37 01/11/2019    MG 2.0 01/04/2018    PHOS 2.6 01/04/2018    TROP 0

## 2019-01-11 NOTE — ED NOTES
Pt presents to ED via EMS for RLQ, RUQ abdominal pain that started this morning. Pt denies any n/v/d. Pt last bm was yesterday. Pt rates his pain as a 6/10.

## 2019-01-11 NOTE — HISTORICAL OFFICE NOTE
Vesna Xiong  : 1929  ACCOUNT:  [id number]  [phone]  PCP: Dr. Patel      TODAY'S DATE: 2018  DICTATED BY:  [Dr. Nieves Palacio: [Followup of . CAD, of native vessels, Followup of .  CHF, left ventricular, Followup hepatomegaly, soft and nontender. MS: limited mobility. NEURO/PSYCH: normal affect. CV: PALP: no lifts, thrills or rub and PMI not displaced. AUSC:  irregular rhythm; no pathologic murmurs. PEDAL: pedal pulses intact. EXT: no peripheral edema.      DEC

## 2019-01-11 NOTE — ED PROVIDER NOTES
Patient Seen in: [facility] AND [facility] Emergency Department    History   Patient presents with:  Abdomen/Flank Pain (GI/)    Stated Complaint: abdominal pain    HPI    51-year-old male with history of AAA status post repair, atrial fibrillation, on Coumadi and are negative. Positive for stated complaint: abdominal pain  Other systems are as noted in HPI. Constitutional and vital signs reviewed. All other systems reviewed and negative except as noted above.     Physical Exam     ED Triage Vitals [01 abdominal pain. Monitor shows atrial fibrillation at a rate of 72 bpm.     My interpretation is   normal for rate   Abnormal for rhythm    Pulse Oximeter:  Pulse oximetry on room air is 92%, indicating inadequate oxygenation.     PROCEDURES:  none    DIAGN Neutrophil % 73 %    Lymphocyte % 13 %    Monocyte % 12 %    Eosinophil % 2 %    Basophil % 1 %    Neutrophil Absolute 4.4 1.8 - 7.7 K/UL    Lymphocyte Absolute 0.8 (L) 1.0 - 4.0 K/UL    Monocyte Absolute 0.7 0.0 - 1.0 K/UL    Eosinophil Absolute 0.1 0.0 - with abdominal pain  - I personally reviewed and interpreted all the ED vitals  - afebrile, hemodynamically stable  - I ordered and personally reviewed the labs and imaging and found no leukocytosis, mild anemia, electrolytes reassuring, T bili mildly elev

## 2019-01-11 NOTE — H&P
[facility]    PATIENT'S NAME: Rosa Quintanillalk   ATTENDING PHYSICIAN: Sheldon Corona MD   PATIENT ACCOUNT#:   [de-identified]    LOCATION:  [place] [address]  MEDICAL RECORD #:   [id number]       YOB: 1929  ADMISSION DATE:       01/11/2 and right total hip arthroplasties; right carpal tunnel release; cataract surgery; and right hemithyroidectomy. MEDICATIONS:  Please see medication reconciliation list.    ALLERGIES:  Side effects to morphine, but no known drug allergies.     FAMILY HIST and severe chronic obstructive pulmonary disease. 3.   Chronic renal insufficiency stage 3. PLAN:  The patient will be admitted to general medical floor. IV fluids, n.p.o., IV Zosyn, pain and nausea control. Cardiology consult;  [facility]

## 2019-01-12 NOTE — PROGRESS NOTES
[address] [facility] [address] 
 
 
 920 [facility] 620 [facility] Patient: Lizabeth Ray. MRN: [id number] EPQ:2/08/2320 About your hospitalization You were admitted on:  August 22, 2018 You last received care in the:  [address] [place] [address] You were discharged on:  August 22, 2018 Why you were hospitalized Your primary diagnosis was:  Not on File Follow-up Information None Your Scheduled Appointments Friday September 07, 2018 10:00 AM EDT  
POST OP with Merissa Barragan, [address] [facility] ([facility]) [place] [address], Suite [facility] 200 [facility]  
[phone] Wednesday September 26, 2018 10:00 AM EDT Office Visit with Taz Linares MD  
Internists of [address] [place] [address] [address] [facility], [facility] 200 [facility]  
[phone] Discharge Orders None A check shree indicates which time of day the medication should be taken. My Medications ASK your doctor about these medications Instructions Each Dose to Equal  
 Morning Noon Evening Bedtime  
 diclofenac EC 75 mg EC tablet Commonly known as:  VOLTAREN Your last dose was: Your next dose is: Take 1 Tab by mouth two (2) times daily as needed. 75 mg  
    
   
   
   
  
 ergocalciferol 50,000 unit capsule Commonly known as:  ERGOCALCIFEROL Your last dose was: Your next dose is: Take 1 Cap by mouth every seven (7) days. [address] Units  
    
   
   
   
  
 levoFLOXacin 750 mg tablet Commonly known as:  Sandee Guile Your last dose was: Your next dose is: Take 1 Tab by mouth daily. 750 mg Discharge Instructions DISCHARGE SUMMARY from Nurse Please call Dr Troy Godinez office to reschedule case PATIENT INSTRUCTIONS: 
 
 
 [facility]     Progress Note        Ailyn Prieto Patient Status:  Inpatient    1929 MRN [id number]   Location [facility] 4W/SW/SE Attending Clari Mccormick MD   Hosp Day # 1 PCP Wilfred Velez MD       Subjective:   Patient Follow up with provider regarding reschedule of case These are general instructions for a healthy lifestyle: No smoking/ No tobacco products/ Avoid exposure to second hand smoke Surgeon General's Warning:  Quitting smoking now greatly reduces serious risk to your health. Obesity, smoking, and sedentary lifestyle greatly increases your risk for illness A healthy diet, regular physical exercise & weight monitoring are important for maintaining a healthy lifestyle You may be retaining fluid if you have a history of heart failure or if you experience any of the following symptoms:  Weight gain of 3 pounds or more overnight or 5 pounds in a week, increased swelling in our hands or feet or shortness of breath while lying flat in bed. Please call your doctor as soon as you notice any of these symptoms; do not wait until your next office visit. Recognize signs and symptoms of STROKE: 
 
F-face looks uneven A-arms unable to move or move unevenly S-speech slurred or non-existent T-time-call 911 as soon as signs and symptoms begin-DO NOT go Back to bed or wait to see if you get better-TIME IS BRAIN. Warning Signs of HEART ATTACK Call 911 if you have these symptoms: 
? Chest discomfort. Most heart attacks involve discomfort in the center of the chest that lasts more than a few minutes, or that goes away and comes back. It can feel like uncomfortable pressure, squeezing, fullness, or pain. ? Discomfort in other areas of the upper body. Symptoms can include pain or discomfort in one or both arms, the back, neck, jaw, or stomach. ? Shortness of breath with or without chest discomfort. ? Other signs may include breaking out in a cold sweat, nausea, or lightheadedness. Don't wait more than five minutes to call [address]! Fast action can save your life. Calling 911 is almost always the fastest way to get lifesaving treatment.  Emergency Medical Services staff can begin [facility]) injection 4 mg 4 mg Intravenous Q6H PRN   Piperacillin Sod-Tazobactam So (ZOSYN) 3.375 g in dextrose 5 % 100 mL ADD-vantage 3.375 g Intravenous Q8H   ipratropium-albuterol (DUONEB) nebulizer solution 3 mL 3 mL Nebulization Q6H PRN   [MAR Hold] Met Iv Contrast, No Oral (er)    Result Date: 1/11/2019  CONCLUSION:   Findings suspicious for acute cholecystitis.   Mild dilation of the common bile duct and intrahepatic biliary ducts are not significantly changed since 2/7/17 and likely secondary to age-rel treatment when they arrive  up to an hour sooner than if someone gets to the hospital by car. The discharge information has been reviewed with the patient. The patient verbalized understanding. Patient armband removed and shredded Introducing Mason Michelle As a [facility] patient, I wanted to make you aware of our electronic visit tool called Mason Michelle. S[employer]X 24/7 allows you to connect within minutes with a medical provider 24 hours a day, seven days a week via a mobile device or tablet or logging into a secure website from your computer. You can access Mason Michelle from anywhere in the [place]. A virtual visit might be right for you when you have a simple condition and feel like you just dont want to get out of bed, or cant get away from work for an appointment, when your regular [facility] provider is not available (evenings, weekends or holidays), or when youre out of town and need minor care. Electronic visits cost only $49 and if the [facility] 24/7 provider determines a prescription is needed to treat your condition, one can be electronically transmitted to a nearby pharmacy*. Please take a moment to enroll today if you have not already done so. The enrollment process is free and takes just a few minutes. To enroll, please download the AdhereTech/OptuLink gigi to your tablet or phone, or visit [email or web address] to enroll on your computer. And, as an [address] [place] [address] patient with a Varioptic account, the results of your visits will be scanned into your electronic medical record and your primary care provider will be able to view the scanned results. We urge you to continue to see your regular [facility] provider for your ongoing medical care.   And while your primary care provider may not be the one available when you seek a Mason Michelle virtual visit, the peace of mind you get from getting a real diagnosis real time can be priceless. For more information on Mason Michelle, view our Frequently Asked Questions (FAQs) at [email or web address]. Sincerely, 
 
Arielle Macdonald MD 
Chief Medical Officer [address] *:  certain medications cannot be prescribed via Mason Michelle Providers Seen During Your Hospitalization Provider Specialty Primary office phone Eli Redmond MD Orthopedic Surgery [phone] Your Primary Care Physician (PCP) Primary Care Physician Office Phone Office Fax 178 [facility], 700 [facility] [phone] You are allergic to the following No active allergies Recent Documentation Height Weight BMI Smoking Status 1.765 m 63 kg 20.23 kg/m2 Current Every Day Smoker Emergency Contacts Name Discharge Info Relation Home Work Mobile Minesh Schwab DISCHARGE CAREGIVER [3] Mother [14] [phone] [facility] CAREGIVER [3] Girlfriend [18] [phone] Patient Belongings The following personal items are in your possession at time of discharge: 
  Dental Appliances: None  Visual Aid: None Please provide this summary of care documentation to your next provider. Signatures-by signing, you are acknowledging that this After Visit Summary has been reviewed with you and you have received a copy. Patient Signature:  ____________________________________________________________ Date:  ____________________________________________________________  
  
Josph Perfect Provider Signature:  ____________________________________________________________ Date:  ____________________________________________________________

## 2019-01-12 NOTE — ANESTHESIA PROCEDURE NOTES
ANESTHESIA INTUBATION  Urgency: elective    Airway not difficult    General Information and Staff    Patient location during procedure: OR  Anesthesiologist: Taran Cervantes MD  Performed: anesthesiologist     Indications and Patient Condition  Indication

## 2019-01-12 NOTE — PROGRESS NOTES
[facility]    Progress Note    Judy Hernandez Patient Status:  Outpatient in a Bed    1929 MRN [id number]   Location [facility] 4W/SW/SE Attending Josefina Cerrato MD   Hosp Day # 1 PCP Lashell Vaca MD       Subjective: acute cholecystitis. Mild dilation of the common bile duct and intrahepatic biliary ducts are not significantly changed since 2/7/17 and likely secondary to age-related biliary prominence. Correlation with bilirubin and LFTs suggested.  MRCP could further

## 2019-01-12 NOTE — OPERATIVE REPORT
[facility]    PATIENT'S NAME: Lyssa Mckeon   ATTENDING PHYSICIAN: Samm Bennett MD   OPERATING PHYSICIAN: Khadra Woods.  Ede Louise MD   PATIENT ACCOUNT#:   [id number]    LOCATION:  [facility] [address] PACU [address] [place] [address]  MEDICAL RECORD #:   [id number] endoclips and transected. The gallbladder was carefully removed from the liver bed with sharp dissection. No perforation of the gallbladder was made. There was no injury to the liver. There was no bleeding. There was good hemostasis.   The gallbladder

## 2019-01-12 NOTE — ANESTHESIA POSTPROCEDURE EVALUATION
Patient: Dean Prieto    Procedure Summary     Date:  01/12/19 Room / Location:  [facility] OR 04 / [facility] OR    Anesthesia Start:  9453 Anesthesia Stop:  2100    Procedure:  LAPAROSCOPIC CHOLECYSTECTOMY (N/A ) Diagnosis:  (Acute Cholecystitis )    Surg

## 2019-01-12 NOTE — BRIEF OP NOTE
Pre-Operative Diagnosis: Acute cholecystitis cholelithiasis     Post-Operative Diagnosis: Acute Cholecystitis       Procedure Performed:   Procedure(s):  Laparoscopic cholecystectomy    Surgeon(s) and Role:     * Aman Baca MD - Primary    Assi

## 2019-01-12 NOTE — ANESTHESIA PREPROCEDURE EVALUATION
Anesthesia PreOp Note    HPI:     Stevie Sandhoff is a 80year old male who presents for preoperative consultation requested by: Gary Roberts MD    Date of Surgery: 1/11/2019 - 1/12/2019    Procedure(s):  LAPAROSCOPIC CHOLECYSTECTOMY  Indication: (six) hours as needed. Disp: 30 tablet Rfl: 0 1/10/2019 at Unknown time   furosemide 20 MG Oral Tab Take 40 mg by mouth daily. Disp:  Rfl:  1/10/2019 at Unknown time   Ezetimibe-Simvastatin (VYTORIN) 10-20 MG Oral Tab Take 1 tablet by mouth nightly.  Disp Intravenous Q8H Sandford Duane, MD Last Rate: 25 mL/hr at 01/12/19 0023 3.375 g at 01/12/19 0023   [MAR Hold] ipratropium-albuterol (DUONEB) nebulizer solution 3 mL 3 mL Nebulization Q6H PRN Sandford Duane, MD     [MAR Hold] Metoprolol Succinate ER (Topr Asked        Caffeine Concern: Yes        Occupational Exposure: Not Asked        Hobby Hazards: Not Asked        Sleep Concern: Not Asked        Stress Concern: Not Asked        Weight Concern: Not Asked        Special Diet: Not Asked        Back Care: No severe, asthma, shortness of breath,     ROS comment: Pleural effusion  Cardiovascular   Exercise tolerance: poor  (+) hypertension well controlled, dysrhythmias (a fib), CHF,     ECG reviewed  Rhythm: regular  Rate: normal    Neuro/Psych - negative ROS

## 2019-01-13 NOTE — FACE TO FACE NOTE
1/13/19 0755   Stopped by to answer questions and concerns. Pt doing fairly well after gallbladder surgery. Discussed heroic measures-pt is a DNR.

## 2019-01-13 NOTE — PROGRESS NOTES
[facility]    Progress Note    Tarsha Ortiz Patient Status:  Outpatient in a Bed    1929 MRN [id number]   Location [facility] 4W/SW/SE Attending Sharmila Diego MD   Hosp Day # 1 PCP Katarina Bonds MD       Subjective:   Lore Yates 3.3 (L) 01/13/2019    ALKPHO 89 01/13/2019    BILT 1.8 (H) 01/13/2019    TP 7.1 01/13/2019     (H) 01/13/2019     (H) 01/13/2019    PTT 45.3 (H) 01/02/2018    INR 2.1 (H) 01/13/2019    LIP 37 01/11/2019    MG 2.0 01/04/2018    PHOS 2.6 01/04/ Macho Mcgovern  1/13/2019

## 2019-01-13 NOTE — PHYSICAL THERAPY NOTE
PHYSICAL THERAPY EVALUATION - INPATIENT     Room Number: 458/458-A  Evaluation Date: 1/13/2019  Type of Evaluation: Initial   Physician Order: PT Eval and Treat    Presenting Problem: acute cholecystitis  Reason for Therapy: Mobility Dysfunction and Disch Aortic aneurysm (HCC)     Repaired   • Arrhythmia     atrial fibrillation   • Asthma    • Congestive heart disease (HCC)    • COPD (chronic obstructive pulmonary disease) ([facility]     uses home oxygen   • Heart attack ([facility]    • Hemoptysis    • High blood pre help from another person does the patient currently need. ..   -   Moving to and from a bed to a chair (including a wheelchair)?: None   -   Need to walk in hospital room?: A Little   -   Climbing 3-5 steps with a railing?: A Little     AM-PAC Score:  Raw S

## 2019-01-13 NOTE — PLAN OF CARE
GASTROINTESTINAL - ADULT    • Minimal or absence of nausea and vomiting Not Progressing    • Maintains or returns to baseline bowel function Not Progressing        GENITOURINARY - ADULT    • Absence of urinary retention Not Progressing        RESPIRATORY -

## 2019-01-13 NOTE — OCCUPATIONAL THERAPY NOTE
OCCUPATIONAL THERAPY EVALUATION - INPATIENT     Room Number: 458/458-A  Evaluation Date: 1/13/2019  Type of Evaluation: Initial  Presenting Problem: ema    Physician Order: IP Consult to Occupational Therapy  Reason for Therapy: ADL/IADL Dysfunction and Inpatient Daily Activity Short Form. Research supports that patients with this level of impairment may benefit from home however this therapist recommends home with assist and HH PT vs. OSCAR pending progress. Pt lives alone.  At baseline, he is on 3.5L O2 a O2 SATURATIONS        SPO2 Ambulation on Oxygen: 90  Ambulation oxygen flow (liters per minute): 6    COGNITION  Orientation Level:  oriented x4  Following Commands:  follows one step commands without difficulty    VISION      Communication: jessica breathing, pacing  Patient End of Session: In bed;Needs met;Call light within reach;RN aware of session/findings; All patient questions and concerns addressed; Alarm set    OT Goals  Patients self stated goal is: to go home     Patient will complete function

## 2019-01-13 NOTE — PROGRESS NOTES
[facility] HOSP - [facility]    Cardiology Progress Note    Read Ralph Patient Status:  Outpatient in a Bed    1929 MRN [id number]   Location [facility] 4W/SW/SE Attending Gurinder Lamb MD   Hosp Day # 1 PCP Shannan Saldana MD     Subject Results   Component Value Date    WBC 6.3 01/13/2019    HGB 12.5 (L) 01/13/2019    HCT 40.6 (L) 01/13/2019    PLT 93 (L) 01/13/2019    CREATSERUM 2.06 (H) 01/13/2019    BUN 24 (H) 01/13/2019     01/13/2019    K 4.9 01/13/2019    CL 96 01/13/2019    C 12-lead    Result Date: 1/11/2019  ECG Report  Interpretation  -------------------------- Atrial fibrillation -Right bundle branch block with left axis -bifascicular block.  -Old inferior-apical infarct.  ABNORMAL When compared with ECG of 03/12/2018 10:50:

## 2019-01-13 NOTE — PROGRESS NOTES
[facility]     Progress Note        Angelyn Schwab Patient Status:  Inpatient    1929 MRN [id number]   Location [facility] 4W/SW/SE Attending Gabriel Youssef MD   Hosp Day # 1 PCP Angeli Aiken MD       Subjective:   Patient clubbing, cyanosis, edema  Neurologic: no gross motor deficits  Skin: warm, dry      Results:     Lab Results   Component Value Date    WBC 6.3 01/13/2019    HGB 12.5 01/13/2019    HCT 40.6 01/13/2019    PLT 93 01/13/2019    CREATSERUM 2.06 01/13/2019    B

## 2019-01-14 NOTE — PROGRESS NOTES
[facility] Pharmacy Note:  Renal Dose Adjustment for Tramadol Daja Cuellar    Adrian Gresham has been prescribed Tramadol (ULTRAM) 50 mg orally every 6 hours as needed for pain. Estimated Creatinine Clearance: 21.1 mL/min (A) (based on SCr of 2.53 mg/dL (H)).

## 2019-01-14 NOTE — PROGRESS NOTES
[facility]    Cardiology Progress Note    Ricky Bless Patient Status:  Outpatient in a Bed    1929 MRN [id number]   Location [facility] 4W/SW/SE Attending Candance Lewis, MD   Hosp Day # 1 PCP Denisa Narvaez MD     Primary vytorin  - stable, asymptomatic     COPD  - stable     Chronic BLE edema  - cont torsemide 20 mg BID when creat improves  - monitor volume status closely  - daily weights ordered  - low sodium diet    Urinary retention with rising creat  - urology to be co

## 2019-01-14 NOTE — PROGRESS NOTES
[facility]    Progress Note    Noemi Asa Patient Status:  Outpatient in a Bed    1929 MRN [id number]   Location [facility] 4W/SW/SE Attending Gaviota Ruffin MD   Hosp Day # 1 PCP Harmony Hinds MD       Subjective: INR     Code status:   Full     >35 minutes spent     Samm Bennett MD  1/13/2019

## 2019-01-14 NOTE — PLAN OF CARE
CARDIOVASCULAR - ADULT    • Maintains optimal cardiac output and hemodynamic stability Not Progressing    • Absence of cardiac arrhythmias or at baseline Not Progressing        Delirium    • Minimize duration of delirium Not Progressing        GASTROINTEST air with dermabond. No complaints of pain. Up with 1x assist and the walker. Significant desaturation with ambulation/activity. INR elevated at 3.0 this morning, coumadin discontinued. No s/s of bleeding.  Patient appears dusky in face, lips, hands, and fee

## 2019-01-14 NOTE — OCCUPATIONAL THERAPY NOTE
OCCUPATIONAL THERAPY TREATMENT NOTE - INPATIENT        Room Number: 458/458-A           Presenting Problem: ema    Problem List  Principal Problem:    Acute cholecystitis      OCCUPATIONAL THERAPY ASSESSMENT     RN cleared pt for participation in occupat with home health OT. However, pending pt's medical progress and progress towards therapy goals, anticipate might benefit from OSCAR as pt is from home alone and currently requires increased assist to safely complete ambulation and ADLs.     DISCHARGE RECOMMEN ASSESSMENT  Grooming: set-up in sitting, donning dentures     Education Provided: role of OT, activity promotion   Patient End of Session: Up in chair;Needs met;Call light within reach;RN aware of session/findings; Alarm set    OT Goals:     Patients self s

## 2019-01-14 NOTE — PROGRESS NOTES
Pulmonary/Critical Care Follow Up Note    HPI:   Fernandez Yan is a 80year old male with Patient presents with:  Abdomen/Flank Pain (GI/)      PCP Magali Alaniz MD  Admission Attending Javier Richards MD    Hospital Day #2    Sob  Mild abd pain  Low ap Blood pressure (!) 87/60, pulse 85, temperature 97.2 °F (36.2 °C), temperature source Oral, resp. rate 20, height 5' 11\" (1.803 m), weight 179 lb (81.2 kg), SpO2 (!) 89 %.     Intake/Output Summary (Last 24 hours) at 1/14/2019 4330  Last data filed at 1/

## 2019-01-14 NOTE — CM/SW NOTE
JB received MDO for discharge planning. Pt has been screened per chart review and during nursing rounds. Pt is from home w/ family, currently outpatient in a bed. PT/OT recommending HHPT.  JB spoke w/ Stephy from [facility] and made referral. SW/CM will remain avail

## 2019-01-14 NOTE — PROGRESS NOTES
[facility] - [facility]    Progress Note    Ricky Bless Patient Status:  Outpatient in a Bed    1929 MRN [id number]   Location [facility] 4W/SW/SE Attending Candance Lewis, MD   Hosp Day # 1 PCP Denisa Narvaez MD       Subjective: (CST): Adam Dodd MD on 1/14/2019 at 12:48                Assessment and Plan:     Acute edemetous cholecystitis. POD #2 s/p lap ema. - cont zosyn  - low fat diet  - pain control  - mobilize, PT/OT     KEE on CKD stage 3  Pt likely dry.    Low urine

## 2019-01-15 NOTE — CONSULTS
[facility]   Gastroenterology Consultation Note    Judy Hernandez  Patient Status:    Inpatient  Date of Admission:         1/11/2019, Hospital day #3  Attending:   Josefina Cerrato MD  PCP:     Lashell Vaca MD    Reason for Consultat Diane Gary MD at [facility] MAIN OR     Family History   Problem Relation Age of Onset   • Heart Disorder Father    • No Known Problems Mother    • No Known Problems Daughter    • No Known Problems Daughter       reports that he quit smoking about 33 years ago.  h negative for crushing sub-sternal chest pain  GASTROINTESTINAL:  see HPI  GENITOURINARY:  negative for dysuria or gross hematuria  INTEGUMENT/BREAST:  SKIN:  jaundice  ALLERGIC/IMMUNOLOGIC:  negative for hay fever  ENDOCRINE:  negative for cold intolerance pleural thickening and chronic changes at the left lung base overall stable. COPD with interstitial fibrosis bilaterally.      Dictated by (CST): Edmund Mclean MD on 1/14/2019 at 12:44     Approved by (CST): Edmund Mclean MD on 1/14/2019 at 12:48 polypectomy, ablation, stent placement, etc.] as indicated. Kena Le, [address] [place] [address] Gastroenterology  1/15/2019    This note was partially prepared using FlexMinder voice recognition dictation software. As a result, errors may occur.

## 2019-01-15 NOTE — PROGRESS NOTES
[facility] Pharmacy Note:  Renal Adjustment for piperacillin/tazobactam (Nael Laird)    Asuncion Conklin is a 80year old male who has been prescribed piperacillin/tazobactam (ZOSYN) 3.375 gm every 8 hrs.   CrCl is estimated creatinine clearance is 14.4 mL/min (A) (base

## 2019-01-15 NOTE — PROGRESS NOTES
Brief note:    John Harris passed away. This AM he was talking and interactive, and decompensated later this AM (d/w RN, trouble breathing,etc). Family at bedside and spoke with them.  Seems to have had a respiratory decompensation, became more acidotic and ultim

## 2019-01-15 NOTE — PLAN OF CARE
Pt  at [facility]. Resusitation not attempted as pt was DNR.     Time of Death 1    Family Notified Yes  Name and Relation roberto, daughter    MD Dr. Etta Ibarra of [facility] AT [employer] Notified Yes (get Rep Name and Case Number) Graeme Green [id number]

## 2019-01-15 NOTE — PROGRESS NOTES
[facility]    Progress Note    Nestorjerrell Packnoah Patient Status:  Inpatient    1929 MRN [id number]   Location [facility] 4W/SW/SE Attending Clari Mccormick MD   Hosp Day # 3 PCP Wilfred Velez MD       Subjective:   Melany Philip He 139 01/14/2019    K 5.2 (H) 01/14/2019    CL 94 (L) 01/14/2019    CO2 29 01/14/2019     (H) 01/14/2019    CA 9.1 01/14/2019    ALB 3.2 (L) 01/14/2019    ALKPHO 93 01/14/2019    BILT 1.8 (H) 01/14/2019    TP 7.4 01/14/2019     (H) 01/14/2019

## 2019-01-15 NOTE — PROGRESS NOTES
[facility]    Progress Note    Angelyn Schwab Patient Status:  Outpatient in a Bed    1929 MRN [id number]   Location [facility] 4W/SW/SE Attending Gabriel Youssef MD   Hosp Day # 3 PCP Angeli Aiken MD       Subjective:   Jason Hash (H) 01/15/2019    CL 92 (L) 01/15/2019    CO2 23 01/15/2019    GLU 77 01/15/2019    CA 9.1 01/15/2019    ALB 3.0 (L) 01/15/2019    ALKPHO 95 01/15/2019    BILT 2.9 (H) 01/15/2019    TP 6.9 01/15/2019    AST 1,403 (H) 01/15/2019     (H) 01/15/2019

## 2019-01-15 NOTE — CONSULTS
[facility] HOSP - [facility]    Report of Consultation    Rocio Peer Patient Status:  Inpatient    1929 MRN [id number]   Location [facility] 4W/SW/SE Attending Kurtis Cooper MD   Hosp Day # 3 PCP Irineo Marshall MD     Date of Admission: file    Social Needs      Financial resource strain: Not on file      Food insecurity - worry: Not on file      Food insecurity - inability: Not on file      Transportation needs - medical: Not on file      Transportation needs - non-medical: Not on file Oral Q6H PRN   ondansetron HCl (ZOFRAN) injection 4 mg 4 mg Intravenous Q6H PRN   Piperacillin Sod-Tazobactam So (ZOSYN) 3.375 g in dextrose 5 % 100 mL ADD-vantage 3.375 g Intravenous Q8H   ipratropium-albuterol (DUONEB) nebulizer solution 3 mL 3 mL Nebuli currently  Genitourinary:has alvarez in  Hematologic/lymphatic: negative for bleeding and easy bruising  Musculoskeletal:negative for back pain, bone pain and muscle weakness  Neurological: negative for gait problems, memory problems and seizures  Behavioral 01/02/2018    INR 3.0 (H) 01/14/2019    LIP 37 01/11/2019    MG 2.0 01/04/2018    PHOS 2.6 01/04/2018    TROP 0.17 (HH) 03/12/2018       Imaging:  [unfilled]   Xr Chest Ap Portable  (cpt=71045)    Result Date: 1/14/2019  CONCLUSION:  1.  There is new ri

## 2019-01-15 NOTE — PROGRESS NOTES
[facility]  Nephrology Daily Progress Note    Tarsha Ortiz  [id number]  80year old      HPI:   Tarsha Ortiz is a 80year old male. Condition has now declined with decrease BP, decrease MS and decreasing O2. .  Now on BiPap and unresp 12.8 01/15/2019    HCT 41.5 01/15/2019    PLT 92 01/15/2019    CREATSERUM 3.71 01/15/2019    BUN 48 01/15/2019     01/15/2019    K 5.6 01/15/2019    CL 92 01/15/2019    CO2 23 01/15/2019    GLU 77 01/15/2019    CA 9.1 01/15/2019    ALB 3.0 01/15/2019 Intravenous, Q12H  •  Normal Saline Flush 0.9 % injection 10 mL, 10 mL, Intravenous, PRN  •  0.9%  NaCl infusion, , Intravenous, Once  •  0.9%  NaCl infusion, , Intravenous, Continuous  •  TraMADol HCl (ULTRAM) tab 50 mg, 50 mg, Oral, Q12H PRN  •  docusate aware that condition is guarded and reaffirms DNR status. U/A posssible UTI. Urine C&S ordered. To PCU/ICU now.   Discussed with RN.              1/15/2019  Jasen Keen MD

## 2019-01-15 NOTE — PROGRESS NOTES
[facility]    Cardiology Progress Note  [facility]    Riddhi Ramos Patient Status:  Inpatient    1929 MRN [id number]   Location [facility] 4W/SW/SE Attending Lilian Rodríguez, [address] [facility] Day # 3 PCP A 01/15/2019     (H) 01/15/2019    PTT 45.3 (H) 01/02/2018    INR 5.6 (HH) 01/15/2019    LIP 37 01/11/2019    MG 2.8 (H) 01/15/2019    PHOS 8.0 (H) 01/15/2019    TROP 0.17 (HH) 03/12/2018       Xr Chest Ap Portable  (cpt=71045)    Result Date: 1/14/20 stated

## 2019-01-16 LAB — BLOOD TYPE BARCODE: 5100

## 2019-01-16 NOTE — DISCHARGE SUMMARY
[facility]    Discharge Summary    Vernadine Breach Patient Status:  Inpatient    1929 MRN [id number]   Location [facility] 4W/SW/SE Attending No att. providers found   Hosp Day # 3 PCP Kel Montano MD     Date of Admission: 0. 5.  CT scan of the abdomen showed findings suspicious for acute cholecystitis, mild dilation of the common bile duct, and intrahepatic biliary ducts are not significantly changed since February 2017; likely secondary to age-related biliary prominence; co Chew      Chew 81 mg by mouth daily. Refills:  0     Calcium-D 600-400 MG-UNIT Tabs      Take 1 tablet by mouth See Admin Instructions. MOWEFR   Refills:  0     furosemide 20 MG Tabs  Commonly known as:  LASIX      Take 40 mg by mouth daily.    Refills:

## 2019-02-28 VITALS
WEIGHT: 162 LBS | HEIGHT: 71 IN | SYSTOLIC BLOOD PRESSURE: 98 MMHG | BODY MASS INDEX: 22.68 KG/M2 | DIASTOLIC BLOOD PRESSURE: 50 MMHG | OXYGEN SATURATION: 98 % | HEART RATE: 96 BPM

## 2019-02-28 VITALS
HEIGHT: 71 IN | DIASTOLIC BLOOD PRESSURE: 54 MMHG | RESPIRATION RATE: 18 BRPM | SYSTOLIC BLOOD PRESSURE: 84 MMHG | HEART RATE: 61 BPM | WEIGHT: 164 LBS | BODY MASS INDEX: 22.96 KG/M2

## 2019-02-28 VITALS
HEART RATE: 83 BPM | RESPIRATION RATE: 18 BRPM | WEIGHT: 173 LBS | SYSTOLIC BLOOD PRESSURE: 90 MMHG | HEIGHT: 71 IN | DIASTOLIC BLOOD PRESSURE: 54 MMHG | BODY MASS INDEX: 24.22 KG/M2

## 2025-06-02 NOTE — BH PROGRESS NOTE
[facility]    Progress Note    Stevie Sandhoff Patient Status:  Outpatient in a Bed    1929 MRN [id number]   Location [facility] 4W/SW/SE Attending Lopez Rivas MD   Hosp Day # 1 PCP Gricelda Bourgeois MD       Subjective:   Rosebud Collet 01/13/2019    CL 96 01/13/2019    CO2 30 01/13/2019     (H) 01/13/2019    CA 8.8 01/13/2019    ALB 3.3 (L) 01/13/2019    ALKPHO 89 01/13/2019    BILT 1.8 (H) 01/13/2019    TP 7.1 01/13/2019     (H) 01/13/2019     (H) 01/13/2019    PTT 02-Jun-2025 08:38

## (undated) DEVICE — TROCAR: Brand: KII FIOS FIRST ENTRY

## (undated) DEVICE — TISSUE RETRIEVAL SYSTEM: Brand: INZII RETRIEVAL SYSTEM

## (undated) DEVICE — Device: Brand: JELCO

## (undated) DEVICE — SOL  .9 1000ML BTL

## (undated) DEVICE — DISPOSABLE SUCTION/IRRIGATOR TUBE SET: Brand: AHTO

## (undated) DEVICE — SUTURE PDS II 4-0 PS-2

## (undated) DEVICE — LAP CHOLE: Brand: MEDLINE INDUSTRIES, INC.

## (undated) DEVICE — [HIGH FLOW INSUFFLATOR,  DO NOT USE IF PACKAGE IS DAMAGED,  KEEP DRY,  KEEP AWAY FROM SUNLIGHT,  PROTECT FROM HEAT AND RADIOACTIVE SOURCES.]: Brand: PNEUMOSURE

## (undated) DEVICE — LAPAROSCOPIC TROCAR SLEEVE/SINGLE USE: Brand: CONVERTIBLE TROCAR SYSTEM

## (undated) DEVICE — TROCARS: Brand: KII® BLUNT TIP ACCESS SYSTEM

## (undated) DEVICE — ENCORE® LATEX MICRO SIZE 7.5, STERILE LATEX POWDER-FREE SURGICAL GLOVE: Brand: ENCORE

## (undated) DEVICE — 3 ML SYRINGE LUER-LOCK TIP: Brand: MONOJECT

## (undated) DEVICE — OPEN-END URETERAL CATHETER SOF-FLEX: Brand: SOF-FLEX

## (undated) DEVICE — NEEDLE HPO 18GA 1.5IN ECLPS

## (undated) DEVICE — SOL  .9 3000ML

## (undated) DEVICE — LIGACLIP 10-M/L, 10MM ENDOSCOPIC ROTATING MULTIPLE CLIP APPLIERS: Brand: LIGACLIP

## (undated) DEVICE — SUTURE VICRYL 0 UR-6

## (undated) DEVICE — TROCAR: Brand: KII® SLEEVE

## (undated) NOTE — IP AVS SNAPSHOT
Patient Demographics     Address Phone    [address] [facility] [phone] (Home)  [phone] (Mobile) *Preferred*      Emergency Contact(s)     Name Relation Home Work Suzy Daughter   [phone]    Kathy Take 6.25 mg by mouth 2 (two) times daily with meals. fluticasone-salmeterol 500-50 MCG/DOSE Aepb   Commonly known as:  ADVAIR DISKUS   Next dose due: Today evening        Inhale 1 puff into the lungs 2 (two) times daily. Inhale into the lungs every other day. VYTORIN 10-20 MG Tabs   Generic drug:  Ezetimibe-Simvastatin   Next dose due: Today evening        Take 1 tablet by mouth nightly.                             Zolpidem Tartrate 5 MG Tabs   Las [id number] docusate sodium (COLACE) cap 100 mg 02/16/17 0919 Given      [id number] ezetimibe (ZETIA) 10 mg, Atorvastatin Calcium (LIPITOR) 10 mg for Vytorin 10-20 ([facility] only) 02/15/17 2057 Given      [id number] ipratropium-albuterol (DUONEB) nebulizer solutio PATIENT'S NAME: Joanne Fuentes ANIKA   ATTENDING PHYSICIAN: Rody Heredia MD   PATIENT ACCOUNT#:   [de-identified]    LOCATION:  [place] [address]  MEDICAL RECORD #:   [id number]       YOB: 1929  ADMISSION DATE:       02/07/2017    HISTORY AND REVIEW OF SYSTEMS:  Progressive cough with occasional wheezing for the last week or two. No fever or chills. Lately, he has been noticing he had specks of blood with his sputum, which is clear to green. The patient denies any sick contacts.   No chest pa d: 02/07/2017 16:14:21  t: 02/07/2017 16:24:39  [facility] [id number]  FB/     Electronically signed by Del Bynum MD on 2/8/2017 11:10 AM               Consults - MD Consult Notes      Consults by Jay Claire DO at 2/8/2017  5:34 PM Past Surgical History  History reviewed. No pertinent past surgical history. Family History  History reviewed. No pertinent family history. Social History  Former tobacco abuse. Denies significant EtOH or illicit drug use.           Current Medicatio furosemide 20 MG Oral Tab Take 40 mg by mouth 2 (two) times daily. ramipril 5 MG Oral Cap Take 5 mg by mouth daily. carvedilol 6.25 MG Oral Tab Take 6.25 mg by mouth 2 (two) times daily with meals.    Ezetimibe-Simvastatin (VYTORIN) 10-20 MG Oral Tab Ta HCT 40.0* 02/08/2017    02/08/2017   CREATSERUM 1.16 02/08/2017   BUN 12 02/08/2017    02/08/2017   K 4.1 02/08/2017    02/08/2017   CO2 28 02/08/2017   GLU 85 02/08/2017   CA 8.0* 02/08/2017   ALB 2.9* 02/07/2017   ALKPHO 49 02/07/2017 2/7/2017  PROCEDURE: CT CHEST ABDOMEN PELVIS (ALL CONTRAST ONLY) (CPT=71260/77970)  COMPARISON: [facility], XR CHEST AP PORTABLE (CPT=71010), 2/07/2017, 12:35.   [facility], [address], 10/29/2014, present. LIVER: No enlargement, atrophy, abnormal density, or significant focal lesion is identified. BILIARY: The gallbladder is present. Mild biliary distention is appreciated.  Extrahepatic biliary dilatation up to 1.2 cm is observed with tapering to t dependently. OTHER: No free air is seen in the abdomen or pelvis.     Dictated by (CST): Vonnie Nunez MD on 2/07/2017 at 15:33     Approved by (CST): Vonnie Nunez MD on 2/07/2017 at 15:50          2/7/2017  CONCLUSION:  1. Multifocal pneumonia, worse COMPARISON: [facility], XR CHEST PA + LAT CHEST (CPT=71020), 2/08/2017,  INDICATIONS: Post left thoracentesis. Bilateral pneumonitis and pleural effusions  TECHNIQUE:   Single view. FINDINGS:  CARDIAC/VASC: Mild cardiomegaly.  Unremarkable the thoracic aorta with peripheral atherosclerotic vascular calcification. The pulmonary vascularity is within normal limits. MEDIAST/SOCRATES: No visible mass or adenopathy. LUNGS/PLEURA: Central pulmonary interstitial opacities are suggested.  The lungs are h denies previous pleural effusions or thoracentesis.  -Coumadin on hold at this time given underlying hemoptysis  -We will monitor hemoptysis at this time. If worsening will have low threshold for inspection bronchoscopy.   -Patient with underlying severe C Qty: 7 each Refills: 0    !! - Potential duplicate medications found. Please discuss with provider. CONTINUE these medications which have CHANGED    acetaminophen 325 MG Oral Tab  Take 2 tablets (650 mg total) by mouth every 6 (six) hours as needed. of 4.4.  Chest x-ray showed COPD changes with layering small left pleural effusion.  CT scan of the chest showed multifocal pneumonia, worse in the left upper and lower lobes, lesser degree in the right upper lobe.  There is bilateral pleural effusion, mor Team.  For all other patients, please follow usual protocol for discharge care transition.     Risk of readmission: Tarsha Ortiz has Moderate Risk of readmission after discharge from the hospital.      Praveen Schneider  2/16/2017  2:35 PM    Timespent> 30 PT Treatment Plan: Bed mobility; Body mechanics; Family education;Balance training;Transfer training;Gait training    SUBJECTIVE  \"I feel better\"    OBJECTIVE  Precautions:  (O2)    WEIGHT BEARING RESTRICTION  Weight Bearing Restriction: None Patient End of Session: Needs met; In bed;Call light within reach;RN aware of session/findings; All patient questions and concerns addressed    CURRENT GOALS   Goals valid until 2/21/17  Goal #1 Patient is able to demonstrate supine - sit EOB @ level: modifi Home Layout: One level  Stairs to Enter : 0     Stairs to Bedroom: 0       Lives With: Alone  Drives: Yes  Patient Owned Equipment: Rolling walker;Cane  Patient Regularly Uses: None;Home O2    Prior Level of [place]: Prior to admission, patient was in -   Moving to and from a bed to a chair (including a wheelchair)?: A Little   -   Need to walk in hospital room?: A Little   -   Climbing 3-5 steps with a railing?: A Little       AM-PAC Score:  Raw Score: 21   PT Approx Degree of Impairment Score: 28.97% aware of pt. Status and plan post session. DISCHARGE RECOMMENDATIONS  PT Discharge Recommendations: Cont skilled therapy in a supervised setting;Sub-acute rehabilitation    PLAN  PT Treatment Plan: Bed mobility; Body mechanics; Endurance; Energy conservat lip breathing to return to 90% with rest. Pt is normally on 2. 5LNC at home. The patient is below baseline and would benefit from skilled inpatient OT to address the above deficits, maximizing patient's ability to return to prior level of function.     OT Takes 2 minutes of pursed lip breathing to return to 90% on 4LNC    COGNITION  Overall Cognitive Status:  WFL - within functional limits  Attention Span:  appears intact  Orientation Level:  oriented x4    VISION  Current Vision: wears glasses all the time tolerance, bed mobility, ADLs, transfer skills, spO2   Patient End of Session: In bed;Needs met;Call light within reach;RN aware of session/findings; All patient questions and concerns addressed (Pt had been up in chair for hours, requested to return to be)

## (undated) NOTE — ED AVS SNAPSHOT
Jil Rock   MRN: [id number]    Department:  [facility] Emergency Department   Date of Visit:  5/2/2018           Disclosure     Insurance plans vary and the physician(s) referred by the ER may not be covered by your plan.  Please contact y within the next three months to obtain basic health screening including reassessment of your blood pressure.     IF THERE IS ANY CHANGE OR WORSENING OF YOUR CONDITION, CALL YOUR PRIMARY CARE PHYSICIAN AT ONCE OR RETURN IMMEDIATELY TO THE EMERGENCY DEPARTMEN

## (undated) NOTE — LETTER
Radha De Los Santos Md  [address], [address] [place] [address]       04/09/18        Patient: Sukh Rodriguez   YOB: 1929   Date of Visit: 4/9/2018       Dear  Dr. Nikita Talbert MD,      Thank you for referring Sukh Rodriguez to my practice.   Pl

## (undated) NOTE — LETTER
Hospital Discharge Documentation  Pt  during his latest admission at [address] [place] [address]. Below is a discharge summary for your review.      From: [address] Hospitalist's Office  Phone: [phone]    Patient discharged time/date: 1/15/2019  2:23 PM  Patient d SpO2 94%   BMI 24.97 kg/m²[CL. 2]     Gen: Dee Parra  CV:   RRR, no m/g/r  Pulm:   faint crackles at bases, coarse bilat  Abd:   +bs, soft, tender as expected after surgery, wounds c/d/i, ND  LE:   No c/c/e  Neuro:   nonfocal          Reason for Pt was on zosyn for PNA.     Hx of severe COPD  Hx of pleural effusion. Pulm was on consult.       Hx of CAD, cardiomyopathy  Cards was on consult.     Hx of atrial fibrillation.   Coumadin was held.        dvt proph:   Elevated INR     Code status:  DNR Hospital Discharge Diagnoses: Acute cholecystitis[CL.1]     Lace+ Score: 73[CL.2]  59-90 High Risk  29-58 Medium Risk  0-28   Low Risk.     TCM Follow-Up Recommendation:  LACE < 29: Low Risk of readmission after discharge from the hospital. No TCM follow-up

## (undated) NOTE — LETTER
Severino Carpio  9/19/1929    A patient of your clinic was recently seen by the hospitalists at [facility], and will need to f/u w you for INR draw on Monday 3/19/18    The patient's last INR values were, as follows:      Lab Results  [place]

## (undated) NOTE — MR AVS SNAPSHOT
[facility]  [address] [place], [place] [address]ic [place] [address] [phone]               Thank you for choosing us for your health care visit with Torsten Amaya. Willy Soto MD.  We are glad to serve you and happy to provide you with this summary of your visit. Reason for [facility] F/U           Medical Issues Discussed Today     Chronic obstructive pulmonary disease, unspecified COPD type    -  Primary    Pleural effusion        Abnormal x-ray        Lung infiltrate          Instructions and Zolpidem Tartrate 5 MG Tabs   Take 1 tablet (5 mg total) by mouth nightly as needed for Sleep. Commonly known as:  AMBIEN           * Notice: This list has 2 medication(s) that are the same as other medications prescribed for you.  Read the directions c ? Cover porch steps with gritty weather proof paint. ? Pay attention to curbs and other changes in surfaces when out in the community. ? Take care when walking on gravel or grassy surfaces. ? Avoid walking on snowy or icy surfaces. ?  Use a cane or walk

## (undated) NOTE — ED AVS SNAPSHOT
Asuncion Conklin   MRN: [id number]    Department:  [facility] Emergency Department   Date of Visit:  4/4/2018           Disclosure     Insurance plans vary and the physician(s) referred by the ER may not be covered by your plan.  Please contact y within the next three months to obtain basic health screening including reassessment of your blood pressure.     IF THERE IS ANY CHANGE OR WORSENING OF YOUR CONDITION, CALL YOUR PRIMARY CARE PHYSICIAN AT ONCE OR RETURN IMMEDIATELY TO THE EMERGENCY DEPARTMEN

## (undated) NOTE — LETTER
[address], [place], [place]     AUTHORIZATION FOR SURGICAL OPERATION OR PROCEDURE    1.    I hereby authorize Dr. Yvonne Todd, my Physician(s) and whomever may be designated as the doctor's Assistant, to perform the following donor transfusion, I will discuss this with my         Physician. 5.   I consent to the photographing of procedure(s) to be performed for the purposes of advancing medicine, science         and/or education, provided my identity is not revealed.  If the pr (Relationship to Patient)      _______________________________________________________________ ____________________________  (Witness signature)                                                                                                  (Date)

## (undated) NOTE — LETTER
Kylee Escobar Md  [address], [address] [place] [address]       05/16/18        Patient: Ricky Blecindy   YOB: 1929   Date of Visit: 5/15/2018       Dear  Dr. Anthony Tipton MD,      Thank you for referring Ricky Bless to my practice.   P

## (undated) NOTE — MR AVS SNAPSHOT
[facility]  [address] [facility], [address]   [phone]               Thank you for choosing us for your health care visit with Lillian Carr.  Eduardo Mascorro MD.  We are glad to serve you and happy to provide you with this summary of have any questions related to insurance coverage. Thank you.          Reason for Today's Visit     Lung Problem           Medical Issues Discussed Today     Panlobular emphysema    -  Primary    Pleural effusion        Pneumonia of right lower lobe due to If you have questions, you can call [phone] to talk to our [facility] Staff. Remember, [employer] is NOT to be used for urgent needs. For medical emergencies, dial 911. Visit https://"[employer]. Located within [facility]. org to learn more.         Educational Infor

## (undated) NOTE — LETTER
Hospital Discharge Documentation  Please phone to schedule a hospital follow up appointment. Pt will need INR follow up for coumadin management on Monday 3/19/18.      From: [address] Hospitalist's Office  Phone: [phone]    Patient discharged time daughter, they did not want any further stress testing angiogram.  Is not certain that his chest pain was really cardiac related is somewhat atypical.  Could be related to costochondritis and he received a dose of steroids for this in the hospital.  His ch Pantoprazole Sodium 40 MG Tbec  Commonly known as:  PROTONIX      Take 1 tablet (40 mg total) by mouth every morning before breakfast.   Quantity:  30 tablet  Refills:  0        CHANGE how you take these medications      Instructions Prescription details Take 50 mg by mouth every 6 (six) hours as needed for Pain. Refills:  0     VYTORIN 10-20 MG Tabs  Generic drug:  Ezetimibe-Simvastatin      Take 1 tablet by mouth nightly.    Refills:  0           Where to Get Your Medications      These medications wer

## (undated) NOTE — LETTER
Hospital Discharge Documentation  Please phone to schedule a hospital follow up appointment.     From: [address] Hospitalist's Office  Phone: [phone]    Patient discharged time/date: 2/16/2017  6:00 PM  Patient discharge disposition:  [facility] Take 2 tablets (650 mg total) by mouth every 6 (six) hours as needed. Qty: 30 tablet Refills: 0      CONTINUE these medications which have NOT CHANGED    furosemide 20 MG Oral Tab  Take 40 mg by mouth 2 (two) times daily.     ramipril 5 MG Oral Cap  Take 5 is bilateral pleural effusion, more on the left than the right, mild to moderate.  The patient was started on IV Zosyn and Zithromax, and he will be admitted to the hospital for further management and evaluation.     Hospital Course:       Multilobar pneumo Electronically signed by Nadeem Guzman MD on 2/16/2017  3:58 PM

## (undated) NOTE — IP AVS SNAPSHOT
[address]   [address] [facility] [phone]                Discharge Summary   2/7/2017    Leora Hoyos           Admission Information        Provider Department    2/7/2017 Jong Schmidt MD [facility] 3w/Sw Stop taking on:  2/23/2017    Samantha Berumen                           Zolpidem Tartrate 5 MG Tabs   Last time this was given:  5 mg on 2/15/2017 10:56 PM   Commonly known as:  AMBIEN        Take 1 tablet (5 mg total) by mouth nightly as needed for Sleep. Next dose due: Tomorrow morning        Take 1 tablet by mouth daily. ramipril 5 MG Caps   Last time this was given:  5 mg on 2/8/2017  9:36 AM   Commonly known as:  ALTACE   Next dose due:   Tomorrow morning        Take 5 mg by m Immunization History as of 2/16/2017  Never Reviewed    INFLUENZA 10/12/2010    Pneumovax 23 (Lot Mgr) 2/27/2012      Recent Hematology Lab Results  (Last 3 results in the past 90 days)    WBC RBC Hemoglobin Hematocrit MCV MCH MCHC RDW Platelet MPV    (01/ Radiology Exams     None      Patient Belongings       Most Recent Value    All belongings returned to patient at discharge Pt's bedside belongings    Medications Sent Home None to return    Medications Returned:           Additional Information       We ar your medications while at home.          Blood Pressure and Cardiac Medications     ramipril 5 MG Oral Cap    carvedilol 6.25 MG Oral Tab         Use: Treat abnormal blood pressure (high or low), cardiac conditions; and/or abnormal heart rates/rhythms   Mos generally include: diarrhea, constipation, headache, allergic reaction (itching, rash, hives)   What to report to your healthcare team: Pain, nausea/vomiting, no bowel movement in 2+ days, diarrhea           Respiratory Medications     ipratropium-albutero